# Patient Record
Sex: FEMALE | Race: BLACK OR AFRICAN AMERICAN | NOT HISPANIC OR LATINO | Employment: FULL TIME | URBAN - METROPOLITAN AREA
[De-identification: names, ages, dates, MRNs, and addresses within clinical notes are randomized per-mention and may not be internally consistent; named-entity substitution may affect disease eponyms.]

---

## 2018-08-17 ENCOUNTER — HOSPITAL ENCOUNTER (EMERGENCY)
Facility: HOSPITAL | Age: 43
Discharge: HOME | End: 2018-08-17
Attending: EMERGENCY MEDICINE
Payer: MEDICAID

## 2018-08-17 VITALS
SYSTOLIC BLOOD PRESSURE: 117 MMHG | TEMPERATURE: 98 F | HEART RATE: 68 BPM | BODY MASS INDEX: 37.03 KG/M2 | HEIGHT: 69 IN | OXYGEN SATURATION: 99 % | RESPIRATION RATE: 18 BRPM | DIASTOLIC BLOOD PRESSURE: 57 MMHG | WEIGHT: 250 LBS

## 2018-08-17 DIAGNOSIS — M54.50 LUMBAR BACK PAIN: ICD-10-CM

## 2018-08-17 DIAGNOSIS — R10.2 SUPRAPUBIC ABDOMINAL PAIN: Primary | ICD-10-CM

## 2018-08-17 DIAGNOSIS — N39.0 URINARY TRACT INFECTION WITHOUT HEMATURIA, SITE UNSPECIFIED: ICD-10-CM

## 2018-08-17 DIAGNOSIS — R07.9 CHEST PAIN: ICD-10-CM

## 2018-08-17 DIAGNOSIS — R10.9 BILATERAL FLANK PAIN: ICD-10-CM

## 2018-08-17 LAB
ALBUMIN SERPL BCP-MCNC: 4 G/DL
ALP SERPL-CCNC: 73 U/L
ALT SERPL W/O P-5'-P-CCNC: 18 U/L
ANION GAP SERPL CALC-SCNC: 9 MMOL/L
AST SERPL-CCNC: 20 U/L
B-HCG UR QL: NEGATIVE
BACTERIA #/AREA URNS HPF: ABNORMAL /HPF
BASOPHILS # BLD AUTO: 0.01 K/UL
BASOPHILS NFR BLD: 0.2 %
BILIRUB SERPL-MCNC: 0.4 MG/DL
BILIRUB UR QL STRIP: NEGATIVE
BUN SERPL-MCNC: 9 MG/DL
CALCIUM SERPL-MCNC: 9.4 MG/DL
CHLORIDE SERPL-SCNC: 104 MMOL/L
CLARITY UR: CLEAR
CO2 SERPL-SCNC: 23 MMOL/L
COLOR UR: YELLOW
CREAT SERPL-MCNC: 0.8 MG/DL
CTP QC/QA: YES
DIFFERENTIAL METHOD: ABNORMAL
EOSINOPHIL # BLD AUTO: 0.1 K/UL
EOSINOPHIL NFR BLD: 1.5 %
ERYTHROCYTE [DISTWIDTH] IN BLOOD BY AUTOMATED COUNT: 16.8 %
EST. GFR  (AFRICAN AMERICAN): >60 ML/MIN/1.73 M^2
EST. GFR  (NON AFRICAN AMERICAN): >60 ML/MIN/1.73 M^2
GLUCOSE SERPL-MCNC: 89 MG/DL
GLUCOSE UR QL STRIP: NEGATIVE
HCT VFR BLD AUTO: 36.5 %
HGB BLD-MCNC: 11.7 G/DL
HGB UR QL STRIP: ABNORMAL
KETONES UR QL STRIP: NEGATIVE
LEUKOCYTE ESTERASE UR QL STRIP: NEGATIVE
LYMPHOCYTES # BLD AUTO: 2.4 K/UL
LYMPHOCYTES NFR BLD: 51.5 %
MCH RBC QN AUTO: 24.3 PG
MCHC RBC AUTO-ENTMCNC: 32.1 G/DL
MCV RBC AUTO: 76 FL
MICROSCOPIC COMMENT: ABNORMAL
MONOCYTES # BLD AUTO: 0.3 K/UL
MONOCYTES NFR BLD: 5.5 %
NEUTROPHILS # BLD AUTO: 1.9 K/UL
NEUTROPHILS NFR BLD: 41.1 %
NITRITE UR QL STRIP: NEGATIVE
PH UR STRIP: 5 [PH] (ref 5–8)
PLATELET # BLD AUTO: 316 K/UL
PMV BLD AUTO: 9.5 FL
POCT GLUCOSE: 81 MG/DL (ref 70–110)
POTASSIUM SERPL-SCNC: 4 MMOL/L
PROT SERPL-MCNC: 9.3 G/DL
PROT UR QL STRIP: NEGATIVE
RBC # BLD AUTO: 4.81 M/UL
RBC #/AREA URNS HPF: 1 /HPF (ref 0–4)
SODIUM SERPL-SCNC: 136 MMOL/L
SP GR UR STRIP: 1.02 (ref 1–1.03)
SQUAMOUS #/AREA URNS HPF: 8 /HPF
TROPONIN I SERPL DL<=0.01 NG/ML-MCNC: <0.006 NG/ML
URN SPEC COLLECT METH UR: ABNORMAL
UROBILINOGEN UR STRIP-ACNC: NEGATIVE EU/DL
WBC # BLD AUTO: 4.72 K/UL
WBC #/AREA URNS HPF: 2 /HPF (ref 0–5)

## 2018-08-17 PROCEDURE — 85025 COMPLETE CBC W/AUTO DIFF WBC: CPT

## 2018-08-17 PROCEDURE — 80053 COMPREHEN METABOLIC PANEL: CPT

## 2018-08-17 PROCEDURE — 63600175 PHARM REV CODE 636 W HCPCS: Performed by: PHYSICIAN ASSISTANT

## 2018-08-17 PROCEDURE — 93005 ELECTROCARDIOGRAM TRACING: CPT

## 2018-08-17 PROCEDURE — 84484 ASSAY OF TROPONIN QUANT: CPT

## 2018-08-17 PROCEDURE — 81000 URINALYSIS NONAUTO W/SCOPE: CPT

## 2018-08-17 PROCEDURE — 93010 ELECTROCARDIOGRAM REPORT: CPT | Mod: ,,, | Performed by: INTERNAL MEDICINE

## 2018-08-17 PROCEDURE — 81025 URINE PREGNANCY TEST: CPT | Performed by: PHYSICIAN ASSISTANT

## 2018-08-17 PROCEDURE — 87086 URINE CULTURE/COLONY COUNT: CPT

## 2018-08-17 PROCEDURE — 82962 GLUCOSE BLOOD TEST: CPT | Mod: 59

## 2018-08-17 PROCEDURE — 96372 THER/PROPH/DIAG INJ SC/IM: CPT

## 2018-08-17 PROCEDURE — 99284 EMERGENCY DEPT VISIT MOD MDM: CPT | Mod: 25

## 2018-08-17 PROCEDURE — 25000003 PHARM REV CODE 250: Performed by: PHYSICIAN ASSISTANT

## 2018-08-17 RX ORDER — CEPHALEXIN 500 MG/1
500 CAPSULE ORAL EVERY 12 HOURS
Qty: 20 CAPSULE | Refills: 0 | Status: SHIPPED | OUTPATIENT
Start: 2018-08-17 | End: 2018-08-27

## 2018-08-17 RX ORDER — KETOROLAC TROMETHAMINE 30 MG/ML
15 INJECTION, SOLUTION INTRAMUSCULAR; INTRAVENOUS
Status: COMPLETED | OUTPATIENT
Start: 2018-08-17 | End: 2018-08-17

## 2018-08-17 RX ORDER — ONDANSETRON 4 MG/1
4 TABLET, ORALLY DISINTEGRATING ORAL EVERY 6 HOURS PRN
Qty: 15 TABLET | Refills: 0 | Status: SHIPPED | OUTPATIENT
Start: 2018-08-17 | End: 2018-08-22

## 2018-08-17 RX ORDER — ACETAMINOPHEN 500 MG
500 TABLET ORAL EVERY 4 HOURS PRN
Qty: 20 TABLET | Refills: 0 | Status: SHIPPED | OUTPATIENT
Start: 2018-08-17 | End: 2018-08-22

## 2018-08-17 RX ORDER — CEPHALEXIN 500 MG/1
500 CAPSULE ORAL
Status: COMPLETED | OUTPATIENT
Start: 2018-08-17 | End: 2018-08-17

## 2018-08-17 RX ORDER — ETODOLAC 200 MG/1
200 CAPSULE ORAL 3 TIMES DAILY PRN
Qty: 20 CAPSULE | Refills: 0 | Status: SHIPPED | OUTPATIENT
Start: 2018-08-17 | End: 2018-08-24

## 2018-08-17 RX ORDER — ONDANSETRON 4 MG/1
4 TABLET, ORALLY DISINTEGRATING ORAL
Status: COMPLETED | OUTPATIENT
Start: 2018-08-17 | End: 2018-08-17

## 2018-08-17 RX ORDER — DICYCLOMINE HYDROCHLORIDE 20 MG/1
20 TABLET ORAL 4 TIMES DAILY PRN
Qty: 28 TABLET | Refills: 0 | Status: SHIPPED | OUTPATIENT
Start: 2018-08-17 | End: 2018-08-24

## 2018-08-17 RX ADMIN — ONDANSETRON 4 MG: 4 TABLET, ORALLY DISINTEGRATING ORAL at 09:08

## 2018-08-17 RX ADMIN — KETOROLAC TROMETHAMINE 15 MG: 30 INJECTION, SOLUTION INTRAMUSCULAR at 09:08

## 2018-08-17 RX ADMIN — CEPHALEXIN 500 MG: 500 CAPSULE ORAL at 11:08

## 2018-08-17 NOTE — ED PROVIDER NOTES
Encounter Date: 2018       History     Chief Complaint   Patient presents with    Abdominal Pain      reports having right lower back pain, and right lower abdominal apin, for past two weeks, denies pain with urination.      CC: Abdominal Pain    HPI:   Pt is a 42 y/o female with history of anemia presents for evaluation of 2 week history of constant, severe 10/10, aching and stabbing suprapubic pain radiating to lumbar back pain and bilateral flanks. Reports pain is worse with walking. Pt reports chronic intermittent tingling to RLE. Denies bowel or bladder incontinence, saddle anesthesia, weakness, IVDU or rash. Pt also c/o aching frontal HA 8/10 that began this morning with associated nausea. Pt is here today with her daughter presenting for vomiting and diarrhea.  Pt denies fever, chills or vomiting. Pt additionally c/o single episode of burning CP lasting 1 hour yesterday that occurred while she was under increased stress.  Denies CP currently, SOB, dizziness, lightheadedness, nausea, vomiting. No attempted tx.               Review of patient's allergies indicates:  No Known Allergies  Past Medical History:   Diagnosis Date    Anemia     Bronchitis      Past Surgical History:   Procedure Laterality Date     SECTION      HAND SURGERY      MYOMECTOMY       Family History   Problem Relation Age of Onset    Stroke Maternal Grandmother     Diabetes Maternal Grandmother     Breast cancer Maternal Grandmother     Diabetes Mother     Hypertension Mother     Diabetes Sister     Colon cancer Neg Hx     Ovarian cancer Neg Hx      Social History     Tobacco Use    Smoking status: Never Smoker   Substance Use Topics    Alcohol use: Yes    Drug use: No     Review of Systems   Constitutional: Negative for chills and fever.   HENT: Negative for congestion, ear pain, rhinorrhea and sore throat.    Eyes: Negative for photophobia and visual disturbance.   Respiratory: Negative for shortness of  breath.    Cardiovascular: Positive for chest pain. Negative for leg swelling.   Gastrointestinal: Positive for abdominal pain and nausea. Negative for constipation, diarrhea and vomiting.   Genitourinary: Positive for flank pain. Negative for dysuria, frequency, hematuria and urgency.   Musculoskeletal: Positive for back pain.   Neurological: Positive for light-headedness and headaches. Negative for dizziness, weakness and numbness.        (-) bowel or bladder incontinence  (-) saddle anesthesia          Physical Exam     Initial Vitals [08/17/18 0822]   BP Pulse Resp Temp SpO2   124/77 78 18 98.2 °F (36.8 °C) 99 %      MAP       --         Physical Exam    Nursing note and vitals reviewed.  Constitutional: She appears well-developed and well-nourished.   HENT:   Head: Normocephalic.   Right Ear: External ear normal.   Left Ear: External ear normal.   Nose: Nose normal.   Mouth/Throat: Oropharynx is clear and moist.   Eyes: Conjunctivae are normal.   Cardiovascular: Normal rate and regular rhythm. Exam reveals no gallop and no friction rub.    No murmur heard.  Pulmonary/Chest: Breath sounds normal. She has no wheezes. She has no rhonchi. She has no rales.   Abdominal: Soft. Bowel sounds are normal. She exhibits no distension. There is tenderness in the suprapubic area. There is no rigidity, no rebound, no guarding, no CVA tenderness, no tenderness at McBurney's point and negative Stout's sign.   TTP to bilateral flank   Musculoskeletal: Normal range of motion.   No midline or paraspinal TTP. Pain to lumbar back reproduced with rotation of spine   Lymphadenopathy:     She has no cervical adenopathy.   Neurological: She is alert. She has normal strength. No cranial nerve deficit or sensory deficit. Coordination and gait normal.   Skin: Skin is warm and dry.   Psychiatric: She has a normal mood and affect.         ED Course   Procedures  Labs Reviewed   URINALYSIS, REFLEX TO URINE CULTURE - Abnormal; Notable for the  following components:       Result Value    Occult Blood UA 2+ (*)     All other components within normal limits    Narrative:     Preferred Collection Type->Urine, Clean Catch   COMPREHENSIVE METABOLIC PANEL - Abnormal; Notable for the following components:    Total Protein 9.3 (*)     All other components within normal limits   CBC W/ AUTO DIFFERENTIAL - Abnormal; Notable for the following components:    Hemoglobin 11.7 (*)     Hematocrit 36.5 (*)     MCV 76 (*)     MCH 24.3 (*)     RDW 16.8 (*)     Lymph% 51.5 (*)     All other components within normal limits   URINALYSIS MICROSCOPIC - Abnormal; Notable for the following components:    Bacteria, UA Moderate (*)     All other components within normal limits    Narrative:     Preferred Collection Type->Urine, Clean Catch   CULTURE, URINE   TROPONIN I   POCT URINE PREGNANCY   POCT GLUCOSE          Imaging Results          X-Ray Chest PA And Lateral (Final result)  Result time 08/17/18 09:38:30    Final result by Chai Hutchison MD (08/17/18 09:38:30)                 Impression:      Normal chest.      Electronically signed by: Chai Hutchison MD  Date:    08/17/2018  Time:    09:38             Narrative:    EXAMINATION:  XR CHEST PA AND LATERAL    CLINICAL HISTORY:  Chest pain, unspecified    TECHNIQUE:  PA and lateral views of the chest were performed.    COMPARISON:  None    FINDINGS:  Heart normal.  Lungs clear.  Bony thorax normal.                                 Medical Decision Making:   Initial Assessment:   43-year-old female with no pertinent past medical history presenting for evaluation of multiple medical complaints as detailed above.  Pt reports suprapubic pain radiating to flank bilaterally and lumbar back identical to symptoms with UTI previously. Mild suprapubic tenderness to palpation no peritoneal signs.  UA contaminated sample.  Will treat patient for possible UTI with Keflex.  Doubt acute surgical abdomen.  The Lumbar back pain  reproduced with rotation spine.  Considered but doubt cauda equina, epidural abscess.  Patient also complaining of 1 episode of chest pain that occurred yesterday lasting 1 hr.  Denies chest pain at this time.  Cardiac workup negative. P Patient also complaining of frontal headache and nausea.  We evaluated with daughter today who is here for vomiting and diarrhea.  No neuro deficits.  No meningeal signs. Patient given Toradol and Zofran with the improvement of her headache and abdominal pain.Patient discharged home in stable condition with Lodine and Tylenol as needed for headaches and back pain. Zofran for nausea. Bentyl if she develops gastroenteritis symptoms. PCP follow up in 2 days. Er return precautions discussed including worsening symptoms or a sneeded. Discussed pt with Dr. Damon who agrees with assessment an dplan.                       Clinical Impression:   The primary encounter diagnosis was Suprapubic abdominal pain. Diagnoses of Chest pain, Bilateral flank pain, Lumbar back pain, and Urinary tract infection without hematuria, site unspecified were also pertinent to this visit.                             Yumiko Arevalo PA-C  08/17/18 1331       Yumiko Arevalo PA-C  08/17/18 1331

## 2018-08-17 NOTE — ED TRIAGE NOTES
"Patient reports low back pain x 2 weeks.  Denies burning with urination, but does report urinary hesitancy. Patient also reports intermittent nausea, denies vomiting or fever.  Patient states, "it feels like I am coming down with something".  "

## 2018-08-17 NOTE — DISCHARGE INSTRUCTIONS
Take full course of Keflex as prescribed for urinary tract infection.  Take Lodine and Tylenol as needed for back pain and headaches.   Take Zofran as needed for nausea.  Take Bentyl as prescribed if you develop abdominal cramping/stomach virus.    Follow up with primary care in  2 days. Return to ER for worsening symptoms, inability to tolerate by mouth or as needed.

## 2018-08-19 LAB — BACTERIA UR CULT: NORMAL

## 2018-09-21 ENCOUNTER — HOSPITAL ENCOUNTER (EMERGENCY)
Facility: HOSPITAL | Age: 43
Discharge: HOME | End: 2018-09-21
Attending: EMERGENCY MEDICINE
Payer: MEDICAID

## 2018-09-21 VITALS
HEART RATE: 88 BPM | TEMPERATURE: 98 F | HEIGHT: 69 IN | SYSTOLIC BLOOD PRESSURE: 128 MMHG | BODY MASS INDEX: 35.55 KG/M2 | DIASTOLIC BLOOD PRESSURE: 76 MMHG | WEIGHT: 240 LBS | OXYGEN SATURATION: 99 % | RESPIRATION RATE: 16 BRPM

## 2018-09-21 DIAGNOSIS — S09.93XA BLUNT TRAUMA OF FACE, INITIAL ENCOUNTER: Primary | ICD-10-CM

## 2018-09-21 LAB
B-HCG UR QL: NEGATIVE
CTP QC/QA: YES

## 2018-09-21 PROCEDURE — 25000003 PHARM REV CODE 250: Performed by: PHYSICIAN ASSISTANT

## 2018-09-21 PROCEDURE — 99284 EMERGENCY DEPT VISIT MOD MDM: CPT

## 2018-09-21 PROCEDURE — 81025 URINE PREGNANCY TEST: CPT | Performed by: PHYSICIAN ASSISTANT

## 2018-09-21 RX ORDER — IBUPROFEN 600 MG/1
600 TABLET ORAL
Status: COMPLETED | OUTPATIENT
Start: 2018-09-21 | End: 2018-09-21

## 2018-09-21 RX ORDER — IBUPROFEN 600 MG/1
600 TABLET ORAL EVERY 6 HOURS PRN
Qty: 15 TABLET | Refills: 0 | Status: SHIPPED | OUTPATIENT
Start: 2018-09-21 | End: 2019-05-01

## 2018-09-21 RX ADMIN — IBUPROFEN 600 MG: 600 TABLET, FILM COATED ORAL at 01:09
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
## 2018-09-21 NOTE — DISCHARGE INSTRUCTIONS
Ibuprofen or Tylenol as needed for discomfort. Ice to face to help with swelling. Follow-up with your primary care provider next week for reevaluation. Return to this ED if you begin with uncontrolled nausea/vomiting, if blurred vision worsens, if any other problems occur.
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
## 2019-03-08 ENCOUNTER — HOSPITAL ENCOUNTER (OUTPATIENT)
Facility: HOSPITAL | Age: 44
Discharge: HOME | End: 2019-03-09
Attending: EMERGENCY MEDICINE | Admitting: EMERGENCY MEDICINE
Payer: MEDICAID

## 2019-03-08 DIAGNOSIS — R07.9 CHEST PAIN, UNSPECIFIED TYPE: ICD-10-CM

## 2019-03-08 DIAGNOSIS — R06.09 DOE (DYSPNEA ON EXERTION): ICD-10-CM

## 2019-03-08 DIAGNOSIS — R07.2 PRECORDIAL PAIN: ICD-10-CM

## 2019-03-08 DIAGNOSIS — R55 SYNCOPE: Primary | ICD-10-CM

## 2019-03-08 DIAGNOSIS — R07.9 CHEST PAIN: ICD-10-CM

## 2019-03-08 LAB
ALBUMIN SERPL BCP-MCNC: 3.4 G/DL
ALP SERPL-CCNC: 71 U/L
ALT SERPL W/O P-5'-P-CCNC: 16 U/L
ANION GAP SERPL CALC-SCNC: 5 MMOL/L
AORTIC ROOT ANNULUS: 2.85 CM
AORTIC VALVE CUSP SEPERATION: 1.78 CM
ASCENDING AORTA: 2.15 CM
AST SERPL-CCNC: 19 U/L
AV INDEX (PROSTH): 0.83
AV MEAN GRADIENT: 5.03 MMHG
AV PEAK GRADIENT: 9.12 MMHG
AV VALVE AREA: 2.51 CM2
AV VELOCITY RATIO: 0.82
B-HCG UR QL: NEGATIVE
BASOPHILS # BLD AUTO: 0 K/UL
BASOPHILS NFR BLD: 0 %
BILIRUB SERPL-MCNC: 0.3 MG/DL
BILIRUB UR QL STRIP: NEGATIVE
BNP SERPL-MCNC: <10 PG/ML
BSA FOR ECHO PROCEDURE: 2.42 M2
BUN SERPL-MCNC: 8 MG/DL
CALCIUM SERPL-MCNC: 8.7 MG/DL
CHLORIDE SERPL-SCNC: 106 MMOL/L
CLARITY UR: CLEAR
CO2 SERPL-SCNC: 26 MMOL/L
COLOR UR: ABNORMAL
CREAT SERPL-MCNC: 0.7 MG/DL
CTP QC/QA: YES
CV ECHO LV RWT: 0.35 CM
D DIMER PPP IA.FEU-MCNC: 0.74 MG/L FEU
DIFFERENTIAL METHOD: ABNORMAL
DOP CALC AO PEAK VEL: 1.51 M/S
DOP CALC AO VTI: 32.39 CM
DOP CALC LVOT AREA: 3.02 CM2
DOP CALC LVOT DIAMETER: 1.96 CM
DOP CALC LVOT PEAK VEL: 1.24 M/S
DOP CALC LVOT STROKE VOLUME: 81.27 CM3
DOP CALCLVOT PEAK VEL VTI: 26.95 CM
E WAVE DECELERATION TIME: 169.87 MSEC
E/A RATIO: 1.2
ECHO LV POSTERIOR WALL: 0.9 CM (ref 0.6–1.1)
EOSINOPHIL # BLD AUTO: 0.1 K/UL
EOSINOPHIL NFR BLD: 2.2 %
ERYTHROCYTE [DISTWIDTH] IN BLOOD BY AUTOMATED COUNT: 17.4 %
EST. GFR  (AFRICAN AMERICAN): >60 ML/MIN/1.73 M^2
EST. GFR  (NON AFRICAN AMERICAN): >60 ML/MIN/1.73 M^2
FRACTIONAL SHORTENING: 32 % (ref 28–44)
GLUCOSE SERPL-MCNC: 95 MG/DL
GLUCOSE UR QL STRIP: NEGATIVE
HCT VFR BLD AUTO: 33.2 %
HGB BLD-MCNC: 10.4 G/DL
HGB UR QL STRIP: ABNORMAL
INTERVENTRICULAR SEPTUM: 0.87 CM (ref 0.6–1.1)
IVRT: 0.07 MSEC
KETONES UR QL STRIP: NEGATIVE
LA MAJOR: 5.37 CM
LA MINOR: 5.39 CM
LA WIDTH: 4.49 CM
LEFT ATRIUM SIZE: 3.33 CM
LEFT ATRIUM VOLUME INDEX: 29.4 ML/M2
LEFT ATRIUM VOLUME: 68.37 CM3
LEFT INTERNAL DIMENSION IN SYSTOLE: 3.55 CM (ref 2.1–4)
LEFT VENTRICLE DIASTOLIC VOLUME INDEX: 55.6 ML/M2
LEFT VENTRICLE DIASTOLIC VOLUME: 129.44 ML
LEFT VENTRICLE MASS INDEX: 71 G/M2
LEFT VENTRICLE SYSTOLIC VOLUME INDEX: 22.7 ML/M2
LEFT VENTRICLE SYSTOLIC VOLUME: 52.79 ML
LEFT VENTRICULAR INTERNAL DIMENSION IN DIASTOLE: 5.2 CM (ref 3.5–6)
LEFT VENTRICULAR MASS: 165.34 G
LEUKOCYTE ESTERASE UR QL STRIP: NEGATIVE
LYMPHOCYTES # BLD AUTO: 2.2 K/UL
LYMPHOCYTES NFR BLD: 53.6 %
MAGNESIUM SERPL-MCNC: 1.8 MG/DL
MCH RBC QN AUTO: 24.3 PG
MCHC RBC AUTO-ENTMCNC: 31.3 G/DL
MCV RBC AUTO: 78 FL
MICROSCOPIC COMMENT: ABNORMAL
MONOCYTES # BLD AUTO: 0.3 K/UL
MONOCYTES NFR BLD: 8.2 %
MV PEAK A VEL: 0.98 M/S
MV PEAK E VEL: 1.18 M/S
NEUTROPHILS # BLD AUTO: 1.5 K/UL
NEUTROPHILS NFR BLD: 36 %
NITRITE UR QL STRIP: NEGATIVE
PH UR STRIP: 7 [PH] (ref 5–8)
PISA TR MAX VEL: 2.55 M/S
PLATELET # BLD AUTO: 281 K/UL
PMV BLD AUTO: 9.3 FL
POTASSIUM SERPL-SCNC: 3.6 MMOL/L
PROT SERPL-MCNC: 7.9 G/DL
PROT UR QL STRIP: NEGATIVE
PULM VEIN S/D RATIO: 0.97
PV PEAK D VEL: 0.61 M/S
PV PEAK S VEL: 0.59 M/S
PV PEAK VELOCITY: 0.93 CM/S
RA MAJOR: 5.07 CM
RA PRESSURE: 8 MMHG
RA WIDTH: 3.71 CM
RBC # BLD AUTO: 4.28 M/UL
RBC #/AREA URNS HPF: 75 /HPF (ref 0–4)
RIGHT VENTRICULAR END-DIASTOLIC DIMENSION: 3.87 CM
RV TISSUE DOPPLER FREE WALL SYSTOLIC VELOCITY 1 (APICAL 4 CHAMBER VIEW): 8.72 M/S
SINUS: 2.7 CM
SODIUM SERPL-SCNC: 137 MMOL/L
SP GR UR STRIP: 1.01 (ref 1–1.03)
STJ: 2.15 CM
TR MAX PG: 26.01 MMHG
TRICUSPID ANNULAR PLANE SYSTOLIC EXCURSION: 1.87 CM
TROPONIN I SERPL DL<=0.01 NG/ML-MCNC: <0.006 NG/ML
TV REST PULMONARY ARTERY PRESSURE: 34 MMHG
URN SPEC COLLECT METH UR: ABNORMAL
UROBILINOGEN UR STRIP-ACNC: NEGATIVE EU/DL
WBC # BLD AUTO: 4.14 K/UL
WBC #/AREA URNS HPF: 4 /HPF (ref 0–5)

## 2019-03-08 PROCEDURE — 25000003 PHARM REV CODE 250: Performed by: EMERGENCY MEDICINE

## 2019-03-08 PROCEDURE — 99220 PR INITIAL OBSERVATION CARE,LEVL III: CPT | Mod: ,,, | Performed by: INTERNAL MEDICINE

## 2019-03-08 PROCEDURE — 25500020 PHARM REV CODE 255: Performed by: EMERGENCY MEDICINE

## 2019-03-08 PROCEDURE — 93010 EKG 12-LEAD: ICD-10-PCS | Mod: ,,, | Performed by: INTERNAL MEDICINE

## 2019-03-08 PROCEDURE — 84484 ASSAY OF TROPONIN QUANT: CPT | Mod: 91

## 2019-03-08 PROCEDURE — 96361 HYDRATE IV INFUSION ADD-ON: CPT | Performed by: EMERGENCY MEDICINE

## 2019-03-08 PROCEDURE — 25000003 PHARM REV CODE 250: Performed by: PHYSICIAN ASSISTANT

## 2019-03-08 PROCEDURE — 80053 COMPREHEN METABOLIC PANEL: CPT

## 2019-03-08 PROCEDURE — 63600175 PHARM REV CODE 636 W HCPCS: Performed by: PHYSICIAN ASSISTANT

## 2019-03-08 PROCEDURE — 83880 ASSAY OF NATRIURETIC PEPTIDE: CPT

## 2019-03-08 PROCEDURE — 96372 THER/PROPH/DIAG INJ SC/IM: CPT | Mod: 59 | Performed by: EMERGENCY MEDICINE

## 2019-03-08 PROCEDURE — 81025 URINE PREGNANCY TEST: CPT | Performed by: EMERGENCY MEDICINE

## 2019-03-08 PROCEDURE — 81000 URINALYSIS NONAUTO W/SCOPE: CPT

## 2019-03-08 PROCEDURE — 85025 COMPLETE CBC W/AUTO DIFF WBC: CPT

## 2019-03-08 PROCEDURE — G0378 HOSPITAL OBSERVATION PER HR: HCPCS

## 2019-03-08 PROCEDURE — 99285 EMERGENCY DEPT VISIT HI MDM: CPT | Mod: 25

## 2019-03-08 PROCEDURE — 93010 ELECTROCARDIOGRAM REPORT: CPT | Mod: ,,, | Performed by: INTERNAL MEDICINE

## 2019-03-08 PROCEDURE — 96360 HYDRATION IV INFUSION INIT: CPT | Mod: 59

## 2019-03-08 PROCEDURE — 93005 ELECTROCARDIOGRAM TRACING: CPT

## 2019-03-08 PROCEDURE — 85379 FIBRIN DEGRADATION QUANT: CPT

## 2019-03-08 PROCEDURE — 99220 PR INITIAL OBSERVATION CARE,LEVL III: ICD-10-PCS | Mod: ,,, | Performed by: INTERNAL MEDICINE

## 2019-03-08 PROCEDURE — 83735 ASSAY OF MAGNESIUM: CPT

## 2019-03-08 RX ORDER — IBUPROFEN 400 MG/1
400 TABLET ORAL EVERY 6 HOURS PRN
Status: DISCONTINUED | OUTPATIENT
Start: 2019-03-08 | End: 2019-03-09 | Stop reason: HOSPADM

## 2019-03-08 RX ORDER — SODIUM CHLORIDE 9 MG/ML
INJECTION, SOLUTION INTRAVENOUS CONTINUOUS
Status: DISCONTINUED | OUTPATIENT
Start: 2019-03-08 | End: 2019-03-09 | Stop reason: HOSPADM

## 2019-03-08 RX ORDER — ENOXAPARIN SODIUM 100 MG/ML
40 INJECTION SUBCUTANEOUS EVERY 24 HOURS
Status: DISCONTINUED | OUTPATIENT
Start: 2019-03-08 | End: 2019-03-09 | Stop reason: HOSPADM

## 2019-03-08 RX ORDER — SODIUM CHLORIDE 0.9 % (FLUSH) 0.9 %
5 SYRINGE (ML) INJECTION
Status: DISCONTINUED | OUTPATIENT
Start: 2019-03-08 | End: 2019-03-09 | Stop reason: HOSPADM

## 2019-03-08 RX ORDER — SODIUM CHLORIDE 9 MG/ML
125 INJECTION, SOLUTION INTRAVENOUS ONCE
Status: COMPLETED | OUTPATIENT
Start: 2019-03-08 | End: 2019-03-08

## 2019-03-08 RX ADMIN — SODIUM CHLORIDE 125 ML/HR: 0.9 INJECTION, SOLUTION INTRAVENOUS at 10:03

## 2019-03-08 RX ADMIN — SODIUM CHLORIDE 500 ML: 0.9 INJECTION, SOLUTION INTRAVENOUS at 10:03

## 2019-03-08 RX ADMIN — IOHEXOL 75 ML: 350 INJECTION, SOLUTION INTRAVENOUS at 12:03

## 2019-03-08 RX ADMIN — SODIUM CHLORIDE: 0.9 INJECTION, SOLUTION INTRAVENOUS at 08:03

## 2019-03-08 RX ADMIN — ENOXAPARIN SODIUM 40 MG: 100 INJECTION SUBCUTANEOUS at 08:03

## 2019-03-08 NOTE — ASSESSMENT & PLAN NOTE
EKG of NSR, no PE seen on CTA, troponin negative. Patient reports intermittent episodes of chest pain and SOB worsened with exertion for the last 6 months, and family history of cardiac disease.  -Troponin trend  -Telemetry  -Echo  -TSH/Lipid  -cardiology consult  -CT head

## 2019-03-08 NOTE — HPI
Fany Fontenot 44 y.o. female presents to the hospital with a chief complaint of syncope. She reports today she was walking downstairs when she experienced a syncopal episode. She reports she was dizzy/lightheaded before the syncopal episode. She believes she was down for a few minutes. She reports she landed on her lower back. She has been experiencing intermittent sharp chest pains caused with exertion for the last 6 months. Today she experienced similar chest pain while at rest, that then resolved on its own. She finds for the last 6 months she has had progressively worsening SOB. She now becomes SOB within walking 1 flight of stairs. She denies chest pain or SOB with the syncopal episode today and denies any symptoms now. Her mother and her mother's aunts had heart disease. Her aunts  in their 50's from heart disease. Her mother  in her 60's. She endorses syncope, nausea, dizziness, SOB, chest pain. She denies vomiting, diarrhea, constipation, dysuria, orthopnea, and lower extremity edema.    In the ED, H&H 10.4&33.2, d-dimer 0.74, troponin negative, UA without nitrites or leukocytes, chest x-ray limited due to body habitus, but no consolidations, no PE on CTA.

## 2019-03-08 NOTE — CONSULTS
[facility]  Cardiology  Consult Note    Patient Name: Fany Fontenot  MRN: [id number]  Admission Date: 3/8/2019  Hospital Length of Stay: 0 days  Code Status: Full Code   Attending Provider: Gucci Perez MD   Consulting Provider: Armen Love MD  Primary Care Physician: Randa Starr MD  Principal Problem:Syncope    Patient information was obtained from patient and ER records.     Inpatient consult to Cardiology  Consult performed by: Armen Love MD  Consult ordered by: Antonio Galvan PA-C  Reason for consult: Syncope, CP        Subjective:     Chief Complaint:  Syncope     HPI:   44 y.o. female presents to the hospital with a chief complaint of syncope. She reports today she was walking downstairs when she experienced a syncopal episode. She reports she was dizzy/lightheaded before the syncopal episode. She believes she was down for a few minutes. She reports she landed on her lower back. She has been experiencing intermittent sharp chest pains caused with exertion for the last 6 months. Today she experienced similar chest pain while at rest, that then resolved on its own. She finds for the last 6 months she has had progressively worsening SOB. She now becomes SOB within walking 1 flight of stairs. She denies chest pain or SOB with the syncopal episode today and denies any symptoms now. Her mother and her mother's aunts had heart disease. Her aunts  in their 50's from heart disease. Her mother  in her 60's. She endorses syncope, nausea, dizziness, SOB, chest pain. She denies vomiting, diarrhea, constipation, dysuria, orthopnea, and lower extremity edema.  In the ED, H&H 10.4&33.2, d-dimer 0.74, troponin negative, UA without nitrites or leukocytes, chest x-ray limited due to body habitus, but no consolidations, no PE on CTA.    The patient is a very pleasant 44-year-old  woman with no prior cardiovascular or medical history on no medications, although  she does have a family history of premature CAD.  She presented to the emergency room with a syncopal episode.  She describes approximately 1 week of nausea and dehydration.  This morning, after wakening and going down to her kitchen, she felt herself getting lightheaded.  She tried to make her way back up stairs to her bed, but apparently lost consciousness.  She fell backwards and thinks she may have hit her head and was on the ground for approximately 15 min.  She awoke after that feeling somewhat groggy.  Prior to passing out she did feel some nausea and diaphoresis.  She did not describe any chest pain or palpitations.  In addition to the symptoms, the patient does describe dyspnea on exertion, as well as exertional chest discomfort consistent with angina.  She denies any chest discomfort at rest.  She otherwise has had no PND, orthopnea, or lower extremity edema.  She denies any melena, hematuria, or claudicant symptoms.    Cardiac workup so far has included a normal electrocardiogram, and negative troponin x1.  Her echo notes mild LV dysfunction, without wall motion abnormality.  There are no valvular abnormalities or pericardial effusion.    Past Medical History:   Diagnosis Date    Anemia     Bronchitis     Syncope 2019       Past Surgical History:   Procedure Laterality Date     SECTION  2008    DEBRIDEMENT OF CARPAL BONE Right 2015    long finger, s/p traumatic injury    DEBRIDEMENT-CARPAL Right 2015    Performed by [facility] at [facility]    MYOMECTOMY  2007    PERCUTANEOUS PINNING OF FINGER Right 2015    long finger, s/p traumatic injury    PINNING-PERCUTANEOUS-FINGER Right 2015    Performed by [facility] at [facility] OR       Review of patient's allergies indicates:  No Known Allergies    No current facility-administered medications on file prior to encounter.      Current Outpatient Medications on File Prior to Encounter    Medication Sig    ibuprofen (ADVIL,MOTRIN) 600 MG tablet Take 1 tablet (600 mg total) by mouth every 6 (six) hours as needed for Pain.     Family History     Problem Relation (Age of Onset)    Breast cancer Maternal Grandmother    Diabetes Maternal Grandmother, Mother, Sister    Hypertension Mother    Stroke Maternal Grandmother        Tobacco Use    Smoking status: Never Smoker    Smokeless tobacco: Never Used   Substance and Sexual Activity    Alcohol use: Yes     Comment: socially    Drug use: No    Sexual activity: Yes     Partners: Male     Birth control/protection: None     Review of Systems   Constitution: Negative for chills, diaphoresis, fever, weakness and malaise/fatigue.   HENT: Negative for nosebleeds.    Eyes: Negative for blurred vision and double vision.   Cardiovascular: Positive for chest pain and syncope. Negative for claudication, cyanosis, dyspnea on exertion, leg swelling, orthopnea, palpitations and paroxysmal nocturnal dyspnea.   Respiratory: Negative for cough, shortness of breath and wheezing.    Skin: Negative for dry skin and poor wound healing.   Musculoskeletal: Negative for back pain, joint swelling and myalgias.   Gastrointestinal: Negative for abdominal pain, nausea and vomiting.   Genitourinary: Negative for hematuria.   Neurological: Negative for dizziness, headaches, numbness and seizures.   Psychiatric/Behavioral: Negative for altered mental status and depression.     Objective:     Vital Signs (Most Recent):  Temp: 98 °F (36.7 °C) (03/08/19 0936)  Pulse: 87 (03/08/19 1406)  Resp: 18(standing) (03/08/19 1007)  BP: 135/74 (03/08/19 1406)  SpO2: 100 % (03/08/19 1332) Vital Signs (24h Range):  Temp:  [98 °F (36.7 °C)] 98 °F (36.7 °C)  Pulse:  [67-87] 87  Resp:  [18-20] 18  SpO2:  [98 %-100 %] 100 %  BP: (127-167)/(64-93) 135/74     Weight: 120.2 kg (265 lb)  Body mass index is 39.13 kg/m².    SpO2: 100 %  O2 Device (Oxygen Therapy): room air      Intake/Output Summary (Last  24 hours) at 3/8/2019 1645  Last data filed at 3/8/2019 1204  Gross per 24 hour   Intake 500 ml   Output --   Net 500 ml       Lines/Drains/Airways     Peripheral Intravenous Line                 Peripheral IV - Single Lumen 03/08/19 1000 Right Antecubital less than 1 day                Physical Exam   Constitutional: She is oriented to person, place, and time. She appears well-developed and well-nourished. No distress.   HENT:   Head: Normocephalic and atraumatic.   Mouth/Throat: No oropharyngeal exudate.   Eyes: Conjunctivae and EOM are normal. Pupils are equal, round, and reactive to light. No scleral icterus.   Neck: Normal range of motion. Neck supple. No JVD present. No tracheal deviation present. No thyromegaly present.   Cardiovascular: Normal rate, regular rhythm, S1 normal and S2 normal. Exam reveals no gallop and no friction rub.   No murmur heard.  Pulmonary/Chest: Effort normal and breath sounds normal. No respiratory distress. She has no wheezes. She has no rales. She exhibits no tenderness.   Abdominal: Soft. She exhibits no distension.   Musculoskeletal: Normal range of motion. She exhibits no edema.   Neurological: She is alert and oriented to person, place, and time. No cranial nerve deficit.   Skin: Skin is warm and dry. She is not diaphoretic.   Psychiatric: She has a normal mood and affect. Her behavior is normal. Judgment normal.       Current Medications:      sodium chloride 0.9%    Laboratory (all labs reviewed):  CBC:  Recent Labs   Lab 08/17/18  1005 03/08/19  1001   WHITE BLOOD CELL COUNT 4.72 4.14   HEMOGLOBIN 11.7 L 10.4 L   HEMATOCRIT 36.5 L 33.2 L   PLATELETS 316 281       CHEMISTRIES:  Recent Labs   Lab 08/17/18  1005 03/08/19  1001   GLUCOSE 89 95   SODIUM 136 137   POTASSIUM 4.0 3.6   BUN BLD 9 8   CREATININE 0.8 0.7   EGFR IF  >60 >60   EGFR IF NON- >60 >60   CALCIUM 9.4 8.7   MAGNESIUM  --  1.8       CARDIAC BIOMARKERS:  Recent Labs   Lab  08/17/18  1005 03/08/19  1001   TROPONIN I <0.006 <0.006       COAGS:        LIPIDS/LFTS:  Recent Labs   Lab 08/17/18  1005 03/08/19  1001   AST 20 19   ALT 18 16       BNP:  Recent Labs   Lab 03/08/19  1001   BNP <10       TSH:        Free T4:        Diagnostic Results:  ECG (personally reviewed and interpreted tracing(s)):  3/8/19 1001 SR 70    Chest X-Ray (personally reviewed and interpreted image(s)): 3/8/19 NAD    CTA Chest 3/8/19  No convincing filling defect to indicate a pulmonary embolus.  The right upper lobe pulmonary artery is partially obscured by artifact from the high-density contrast in the SVC.  Soft tissue prominence in the subcarinal region either the esophagus or subcarinal node.    Echo: ordered        Assessment and Plan:     * Syncope    Symptoms seem most consistent with a neurocardiogenic etiology and the pt describes 1 week of nausea leading up to this suggest dehydration as well.  EKG is normal, trop x1 is normal and LV fxn is only mildly diminished.  CTA neg for PE.  I doubt ACS or malignant arrhythmia.  Check postural VS.  Hydrate.  Repeat trop.  Assuming no significant elev and no arrhythmia overnight, dispo planning for outpatient cardiac stress testing is appropriate.     Precordial pain    CP with FH premat CAD as only RF.  EKG normal  Echo with EF 45%  Will plan outpatient stress testing         VTE Risk Mitigation (From admission, onward)    None          Thank you for your consult. I will follow-up with patient. Please contact us if you have any additional questions.    Armen Love MD  Cardiology   [facility]

## 2019-03-08 NOTE — SUBJECTIVE & OBJECTIVE
Past Medical History:   Diagnosis Date    Anemia     Bronchitis     Syncope 2019       Past Surgical History:   Procedure Laterality Date     SECTION  2008    DEBRIDEMENT OF CARPAL BONE Right 2015    long finger, s/p traumatic injury    DEBRIDEMENT-CARPAL Right 2015    Performed by [facility] at [facility]    MYOMECTOMY  2007    PERCUTANEOUS PINNING OF FINGER Right 2015    long finger, s/p traumatic injury    PINNING-PERCUTANEOUS-FINGER Right 2015    Performed by Nik[facility]ai Ha MD at [facility] [place]       Review of patient's allergies indicates:  No Known Allergies    No current facility-administered medications on file prior to encounter.      Current Outpatient Medications on File Prior to Encounter   Medication Sig    ibuprofen (ADVIL,MOTRIN) 600 MG tablet Take 1 tablet (600 mg total) by mouth every 6 (six) hours as needed for Pain.     Family History     Problem Relation (Age of Onset)    Breast cancer Maternal Grandmother    Diabetes Maternal Grandmother, Mother, Sister    Hypertension Mother    Stroke Maternal Grandmother        Tobacco Use    Smoking status: Never Smoker    Smokeless tobacco: Never Used   Substance and Sexual Activity    Alcohol use: Yes     Comment: socially    Drug use: No    Sexual activity: Yes     Partners: Male     Birth control/protection: None     Review of Systems   Constitution: Negative for chills, diaphoresis, fever, weakness and malaise/fatigue.   HENT: Negative for nosebleeds.    Eyes: Negative for blurred vision and double vision.   Cardiovascular: Positive for chest pain and syncope. Negative for claudication, cyanosis, dyspnea on exertion, leg swelling, orthopnea, palpitations and paroxysmal nocturnal dyspnea.   Respiratory: Negative for cough, shortness of breath and wheezing.    Skin: Negative for dry skin and poor wound healing.   Musculoskeletal: Negative for back pain, joint swelling and myalgias.    Gastrointestinal: Negative for abdominal pain, nausea and vomiting.   Genitourinary: Negative for hematuria.   Neurological: Negative for dizziness, headaches, numbness and seizures.   Psychiatric/Behavioral: Negative for altered mental status and depression.     Objective:     Vital Signs (Most Recent):  Temp: 98 °F (36.7 °C) (03/08/19 0936)  Pulse: 87 (03/08/19 1406)  Resp: 18(standing) (03/08/19 1007)  BP: 135/74 (03/08/19 1406)  SpO2: 100 % (03/08/19 1332) Vital Signs (24h Range):  Temp:  [98 °F (36.7 °C)] 98 °F (36.7 °C)  Pulse:  [67-87] 87  Resp:  [18-20] 18  SpO2:  [98 %-100 %] 100 %  BP: (127-167)/(64-93) 135/74     Weight: 120.2 kg (265 lb)  Body mass index is 39.13 kg/m².    SpO2: 100 %  O2 Device (Oxygen Therapy): room air      Intake/Output Summary (Last 24 hours) at 3/8/2019 1645  Last data filed at 3/8/2019 1204  Gross per 24 hour   Intake 500 ml   Output --   Net 500 ml       Lines/Drains/Airways     Peripheral Intravenous Line                 Peripheral IV - Single Lumen 03/08/19 1000 Right Antecubital less than 1 day                Physical Exam   Constitutional: She is oriented to person, place, and time. She appears well-developed and well-nourished. No distress.   HENT:   Head: Normocephalic and atraumatic.   Mouth/Throat: No oropharyngeal exudate.   Eyes: Conjunctivae and EOM are normal. Pupils are equal, round, and reactive to light. No scleral icterus.   Neck: Normal range of motion. Neck supple. No JVD present. No tracheal deviation present. No thyromegaly present.   Cardiovascular: Normal rate, regular rhythm, S1 normal and S2 normal. Exam reveals no gallop and no friction rub.   No murmur heard.  Pulmonary/Chest: Effort normal and breath sounds normal. No respiratory distress. She has no wheezes. She has no rales. She exhibits no tenderness.   Abdominal: Soft. She exhibits no distension.   Musculoskeletal: Normal range of motion. She exhibits no edema.   Neurological: She is alert and  oriented to person, place, and time. No cranial nerve deficit.   Skin: Skin is warm and dry. She is not diaphoretic.   Psychiatric: She has a normal mood and affect. Her behavior is normal. Judgment normal.       Current Medications:      sodium chloride 0.9%    Laboratory (all labs reviewed):  CBC:  Recent Labs   Lab 08/17/18  1005 03/08/19  1001   WHITE BLOOD CELL COUNT 4.72 4.14   HEMOGLOBIN 11.7 L 10.4 L   HEMATOCRIT 36.5 L 33.2 L   PLATELETS 316 281       CHEMISTRIES:  Recent Labs   Lab 08/17/18  1005 03/08/19  1001   GLUCOSE 89 95   SODIUM 136 137   POTASSIUM 4.0 3.6   BUN BLD 9 8   CREATININE 0.8 0.7   EGFR IF  >60 >60   EGFR IF NON- >60 >60   CALCIUM 9.4 8.7   MAGNESIUM  --  1.8       CARDIAC BIOMARKERS:  Recent Labs   Lab 08/17/18  1005 03/08/19  1001   TROPONIN I <0.006 <0.006       COAGS:        LIPIDS/LFTS:  Recent Labs   Lab 08/17/18  1005 03/08/19  1001   AST 20 19   ALT 18 16       BNP:  Recent Labs   Lab 03/08/19  1001   BNP <10       TSH:        Free T4:        Diagnostic Results:  ECG (personally reviewed and interpreted tracing(s)):  3/8/19 1001 SR 70    Chest X-Ray (personally reviewed and interpreted image(s)): 3/8/19 NAD    CTA Chest 3/8/19  No convincing filling defect to indicate a pulmonary embolus.  The right upper lobe pulmonary artery is partially obscured by artifact from the high-density contrast in the SVC.  Soft tissue prominence in the subcarinal region either the esophagus or subcarinal node.    Echo: ordered

## 2019-03-08 NOTE — HOSPITAL COURSE
Fany Fontenot 44 y.o. female placed in observation for syncope. In the ED, H&H 10.4&33.2, d-dimer 0.74, troponin negative, UA without nitrites or leukocytes, chest x-ray limited due to body habitus, but no consolidations, no PE on CTA. Echo with mildly depressed left ventricular function. Orthostatic negative. Cardiology evaluated patient and recommended outpatient stress test and follow up in clinic. She had no further episodes of chest pain or SOB. She was cleared for discharge by cardiology with no arrhythmia events overnight on telemetry. The syncope was mostly likely neurocardiogenic in origin, as it was associated with some nausea.  At discharge, patient was stable without complaint. She will follow up with [place] clinic for further cardiology evaluation.

## 2019-03-08 NOTE — NURSING
Patient arrived to floor in stable condition. Placed patient on the monitor. Visualized patient and assessed patient's overall condition and appearance. NAD noted. Will continue to monitor.

## 2019-03-08 NOTE — ED TRIAGE NOTES
Pt arrives to er via personal vehicle with family from home [id number]. Pt states around 0600 this am, she felt dizzy and passed out hitting her head and back. Pt states has been having chest pain on and off for past 2 weeks, sob, dizziness. Denies numbness/tingling, chest pain at this time.

## 2019-03-08 NOTE — H&P
"[facility] Medicine  History & Physical    Patient Name: Fany Fontenot  MRN: [id number]  Admission Date: 3/8/2019  Attending Physician: Trisha Ro MD   Primary Care Provider: Randa Starr MD         Patient information was obtained from patient, past medical records and ER records.     Subjective:     Principal Problem:Syncope    Chief Complaint:   Chief Complaint   Patient presents with    Loss of Consciousness     "I passed out this morning. I just fell on floor on carpet." emesis 2 days ago, reports nausea        HPI: Fany Fontenot 44 y.o. female presents to the hospital with a chief complaint of syncope. She reports today she was walking downstairs when she experienced a syncopal episode. She reports she was dizzy/lightheaded before the syncopal episode. She believes she was down for a few minutes. She reports she landed on her lower back. She has been experiencing intermittent sharp chest pains caused with exertion for the last 6 months. Today she experienced similar chest pain while at rest, that then resolved on its own. She finds for the last 6 months she has had progressively worsening SOB. She now becomes SOB within walking 1 flight of stairs. She denies chest pain or SOB with the syncopal episode today and denies any symptoms now. Her mother and her mother's aunts had heart disease. Her aunts  in their 50's from heart disease. Her mother  in her 60's. She endorses syncope, nausea, dizziness, SOB, chest pain. She denies vomiting, diarrhea, constipation, dysuria, orthopnea, and lower extremity edema.    In the ED, H&H 10.4&33.2, d-dimer 0.74, troponin negative, UA without nitrites or leukocytes, chest x-ray limited due to body habitus, but no consolidations, no PE on CTA.    Past Medical History:   Diagnosis Date    Anemia     Bronchitis        Past Surgical History:   Procedure Laterality Date     SECTION      DEBRIDEMENT-CARPAL Right 2015    " Performed by [facility] at [facility] OR    HAND SURGERY      MYOMECTOMY      PINNING-PERCUTANEOUS-FINGER Right 4/1/2015    Performed by [facility]ai Ha MD at [facility] OR       Review of patient's allergies indicates:  No Known Allergies    No current facility-administered medications on file prior to encounter.      Current Outpatient Medications on File Prior to Encounter   Medication Sig    ibuprofen (ADVIL,MOTRIN) 600 MG tablet Take 1 tablet (600 mg total) by mouth every 6 (six) hours as needed for Pain.     Family History     Problem Relation (Age of Onset)    Breast cancer Maternal Grandmother    Diabetes Maternal Grandmother, Mother, Sister    Hypertension Mother    Stroke Maternal Grandmother        Tobacco Use    Smoking status: Never Smoker    Smokeless tobacco: Never Used   Substance and Sexual Activity    Alcohol use: Yes     Comment: socially    Drug use: No    Sexual activity: Yes     Partners: Male     Birth control/protection: None     Review of Systems   Constitutional: Negative for chills and fever.   HENT: Negative for congestion, nosebleeds and tinnitus.    Eyes: Negative for photophobia and visual disturbance.   Respiratory: Positive for shortness of breath. Negative for wheezing.    Cardiovascular: Positive for chest pain. Negative for palpitations and leg swelling.   Gastrointestinal: Positive for nausea. Negative for abdominal distention, abdominal pain and vomiting.   Genitourinary: Negative for dysuria, flank pain and hematuria.   Musculoskeletal: Negative for gait problem and joint swelling.   Skin: Negative for rash and wound.   Neurological: Positive for syncope. Negative for seizures.     Objective:     Vital Signs (Most Recent):  Temp: 98 °F (36.7 °C) (03/08/19 0936)  Pulse: 87 (03/08/19 1406)  Resp: 18(standing) (03/08/19 1007)  BP: 135/74 (03/08/19 1406)  SpO2: 100 % (03/08/19 1332) Vital Signs (24h Range):  Temp:  [98 °F (36.7 °C)] 98 °F (36.7 °C)  Pulse:  [67-87]  87  Resp:  [18-20] 18  SpO2:  [98 %-100 %] 100 %  BP: (127-167)/(64-93) 135/74     Weight: 120.2 kg (265 lb)  Body mass index is 39.13 kg/m².    Physical Exam   Constitutional: She is oriented to person, place, and time. She appears well-developed and well-nourished. No distress.   HENT:   Head: Normocephalic and atraumatic.   Right Ear: External ear normal.   Left Ear: External ear normal.   No scalp tenderness   Eyes: Conjunctivae and EOM are normal. Right eye exhibits no discharge. Left eye exhibits no discharge.   Neck: Normal range of motion. No thyromegaly present.   Full ROM no tenderness of neck   Cardiovascular: Normal rate and regular rhythm.   No murmur heard.  Pulmonary/Chest: Effort normal and breath sounds normal. No respiratory distress.   Abdominal: Soft. Bowel sounds are normal. She exhibits no distension and no mass. There is no tenderness.   Musculoskeletal: She exhibits no edema or deformity.   No tenderness of cervical, thoracic, or lumbar spine. No tenderess of hips.   Neurological: She is alert and oriented to person, place, and time. No sensory deficit. She exhibits normal muscle tone.   Finger to nose intact, heel to shin intact, thumb to finger intact, 5/5 strength upper and lower extremities   Skin: Skin is warm and dry.   Psychiatric: She has a normal mood and affect. Her behavior is normal.   Nursing note and vitals reviewed.        CRANIAL NERVES     CN III, IV, VI   Extraocular motions are normal.        Significant Labs:   CBC:   Recent Labs   Lab 03/08/19  1001   WBC 4.14   HGB 10.4*   HCT 33.2*        CMP:   Recent Labs   Lab 03/08/19  1001      K 3.6      CO2 26   GLU 95   BUN 8   CREATININE 0.7   CALCIUM 8.7   PROT 7.9   ALBUMIN 3.4*   BILITOT 0.3   ALKPHOS 71   AST 19   ALT 16   ANIONGAP 5*   EGFRNONAA >60     Cardiac Markers:   Recent Labs   Lab 03/08/19  1001   BNP <10     Troponin:   Recent Labs   Lab 03/08/19  1001   TROPONINI <0.006     Urine Studies:    Recent Labs   Lab 03/08/19  1135   COLORU Straw   APPEARANCEUA Clear   PHUR 7.0   SPECGRAV 1.010   PROTEINUA Negative   GLUCUA Negative   KETONESU Negative   BILIRUBINUA Negative   OCCULTUA 3+*   NITRITE Negative   UROBILINOGEN Negative   LEUKOCYTESUR Negative   RBCUA 75*   WBCUA 4       Significant Imaging:   Imaging Results          X-Ray Chest AP Portable (Final result)  Result time 03/08/19 13:11:38    Final result by Sean Goff MD (03/08/19 13:11:38)                 Impression:      1. Interstitial findings are likely related to habitus and shallow inspiratory effort, no large focal consolidation.      Electronically signed by: Sean Goff MD  Date:    03/08/2019  Time:    13:11             Narrative:    EXAMINATION:  XR CHEST AP PORTABLE    CLINICAL HISTORY:  Chest Pain;    TECHNIQUE:  Single frontal view of the chest was performed.    COMPARISON:  CT 03/08/2019, radiograph 08/17/2018    FINDINGS:  The cardiomediastinal silhouette is not enlarged, magnified by technique..  There is no pleural effusion.  The trachea is midline.  The lungs are symmetrically expanded bilaterally with mildly coarse interstitial attenuation.  No large focal consolidation seen.  There is no pneumothorax.  The osseous structures are unremarkable.                               CTA Chest Non-Coronary (PE Study) (Final result)  Result time 03/08/19 12:36:21    Final result by Antonio Bui Jr., MD (03/08/19 12:36:21)                 Impression:      No convincing filling defect to indicate a pulmonary embolus.  The right upper lobe pulmonary artery is partially obscured by artifact from the high-density contrast in the SVC.    Soft tissue prominence in the subcarinal region either the esophagus or subcarinal node.      Electronically signed by: Antonio Bui MD  Date:    03/08/2019  Time:    12:36             Narrative:    EXAMINATION:  CTA CHEST NON CORONARY    CLINICAL HISTORY:  Chest pain, acute, PE suspected,  intermed prob, positive D-dimer;Chest pain with SOB and syncope. D-dimer 0.74;    TECHNIQUE:  Low dose axial images, sagittal and coronal reformations were obtained from the thoracic inlet to the lung bases following the IV administration of 75 mL of Omnipaque 350.  Contrast timing was optimized to evaluate the pulmonary arteries.  MIP images were performed.    COMPARISON:  None    FINDINGS:  There is fair opacification of the pulmonary vessels.  No convincing filling defect.  Unfortunately there is some artifact from the SVC partially obscuring the proximal right upper lobe pulmonary artery at the hilum.  Otherwise no convincing filling defects identified.  Some soft tissue prominence in the subcarinal region presumably the esophagus.  Subcarinal node not excluded.  No additional mediastinal or hilar nodes.  No significant pleural or pericardial fluid.  Aorta and great vessels are patent.    In the upper abdomen no significant abnormality seen.    Evaluation of the lungs demonstrate some respiratory motion artifact.  No confluent consolidation.  The airway is patent.    Bone windows demonstrate no lytic or blastic lesions.                                  Assessment/Plan:     * Syncope    EKG of NSR, no PE seen on CTA, troponin negative. Patient reports intermittent episodes of chest pain and SOB worsened with exertion for the last 6 months, and family history of cardiac disease.  -Troponin trend  -Telemetry  -Echo  -TSH/Lipid  -cardiology consult  -CT head     Precordial pain    Cardiology consulted.  See above         VTE Risk Mitigation (From admission, onward)        Ordered     enoxaparin injection 40 mg  Daily      03/08/19 0502        VTE:  Code: full  Diet: regular  Dispo: pending cardiology eval and echo    Antonio Galvan PA-C  Department of Hospital Medicine   [facility]

## 2019-03-08 NOTE — ASSESSMENT & PLAN NOTE
Symptoms seem most consistent with a neurocardiogenic etiology and the pt describes 1 week of nausea leading up to this suggest dehydration as well.  EKG is normal, trop x1 is normal and LV fxn is only mildly diminished.  CTA neg for PE.  I doubt ACS or malignant arrhythmia.  Check postural VS.  Hydrate.  Repeat trop.  Assuming no significant elev and no arrhythmia overnight, dispo planning for outpatient cardiac stress testing is appropriate.

## 2019-03-08 NOTE — SUBJECTIVE & OBJECTIVE
Past Medical History:   Diagnosis Date    Anemia     Bronchitis        Past Surgical History:   Procedure Laterality Date     SECTION      DEBRIDEMENT-CARPAL Right 2015    Performed by [facility]ai Ha MD at [facility] OR    HAND SURGERY      MYOMECTOMY      PINNING-PERCUTANEOUS-FINGER Right 2015    Performed by Nikolia Ha MD at [facility] OR       Review of patient's allergies indicates:  No Known Allergies    No current facility-administered medications on file prior to encounter.      Current Outpatient Medications on File Prior to Encounter   Medication Sig    ibuprofen (ADVIL,MOTRIN) 600 MG tablet Take 1 tablet (600 mg total) by mouth every 6 (six) hours as needed for Pain.     Family History     Problem Relation (Age of Onset)    Breast cancer Maternal Grandmother    Diabetes Maternal Grandmother, Mother, Sister    Hypertension Mother    Stroke Maternal Grandmother        Tobacco Use    Smoking status: Never Smoker    Smokeless tobacco: Never Used   Substance and Sexual Activity    Alcohol use: Yes     Comment: socially    Drug use: No    Sexual activity: Yes     Partners: Male     Birth control/protection: None     Review of Systems   Constitutional: Negative for chills and fever.   HENT: Negative for congestion, nosebleeds and tinnitus.    Eyes: Negative for photophobia and visual disturbance.   Respiratory: Positive for shortness of breath. Negative for wheezing.    Cardiovascular: Positive for chest pain. Negative for palpitations and leg swelling.   Gastrointestinal: Positive for nausea. Negative for abdominal distention, abdominal pain and vomiting.   Genitourinary: Negative for dysuria, flank pain and hematuria.   Musculoskeletal: Negative for gait problem and joint swelling.   Skin: Negative for rash and wound.   Neurological: Positive for syncope. Negative for seizures.     Objective:     Vital Signs (Most Recent):  Temp: 98 °F (36.7 °C) (19 0936)  Pulse: 87  (03/08/19 1406)  Resp: 18(standing) (03/08/19 1007)  BP: 135/74 (03/08/19 1406)  SpO2: 100 % (03/08/19 1332) Vital Signs (24h Range):  Temp:  [98 °F (36.7 °C)] 98 °F (36.7 °C)  Pulse:  [67-87] 87  Resp:  [18-20] 18  SpO2:  [98 %-100 %] 100 %  BP: (127-167)/(64-93) 135/74     Weight: 120.2 kg (265 lb)  Body mass index is 39.13 kg/m².    Physical Exam   Constitutional: She is oriented to person, place, and time. She appears well-developed and well-nourished. No distress.   HENT:   Head: Normocephalic and atraumatic.   Right Ear: External ear normal.   Left Ear: External ear normal.   No scalp tenderness   Eyes: Conjunctivae and EOM are normal. Right eye exhibits no discharge. Left eye exhibits no discharge.   Neck: Normal range of motion. No thyromegaly present.   Full ROM no tenderness of neck   Cardiovascular: Normal rate and regular rhythm.   No murmur heard.  Pulmonary/Chest: Effort normal and breath sounds normal. No respiratory distress.   Abdominal: Soft. Bowel sounds are normal. She exhibits no distension and no mass. There is no tenderness.   Musculoskeletal: She exhibits no edema or deformity.   No tenderness of cervical, thoracic, or lumbar spine. No tenderess of hips.   Neurological: She is alert and oriented to person, place, and time. No sensory deficit. She exhibits normal muscle tone.   Finger to nose intact, heel to shin intact, thumb to finger intact, 5/5 strength upper and lower extremities   Skin: Skin is warm and dry.   Psychiatric: She has a normal mood and affect. Her behavior is normal.   Nursing note and vitals reviewed.        CRANIAL NERVES     CN III, IV, VI   Extraocular motions are normal.        Significant Labs:   CBC:   Recent Labs   Lab 03/08/19  1001   WBC 4.14   HGB 10.4*   HCT 33.2*        CMP:   Recent Labs   Lab 03/08/19  1001      K 3.6      CO2 26   GLU 95   BUN 8   CREATININE 0.7   CALCIUM 8.7   PROT 7.9   ALBUMIN 3.4*   BILITOT 0.3   ALKPHOS 71   AST 19    ALT 16   ANIONGAP 5*   EGFRNONAA >60     Cardiac Markers:   Recent Labs   Lab 03/08/19  1001   BNP <10     Troponin:   Recent Labs   Lab 03/08/19  1001   TROPONINI <0.006     Urine Studies:   Recent Labs   Lab 03/08/19  1135   COLORU Straw   APPEARANCEUA Clear   PHUR 7.0   SPECGRAV 1.010   PROTEINUA Negative   GLUCUA Negative   KETONESU Negative   BILIRUBINUA Negative   OCCULTUA 3+*   NITRITE Negative   UROBILINOGEN Negative   LEUKOCYTESUR Negative   RBCUA 75*   WBCUA 4       Significant Imaging:   Imaging Results          X-Ray Chest AP Portable (Final result)  Result time 03/08/19 13:11:38    Final result by Sean Goff MD (03/08/19 13:11:38)                 Impression:      1. Interstitial findings are likely related to habitus and shallow inspiratory effort, no large focal consolidation.      Electronically signed by: Sean Goff MD  Date:    03/08/2019  Time:    13:11             Narrative:    EXAMINATION:  XR CHEST AP PORTABLE    CLINICAL HISTORY:  Chest Pain;    TECHNIQUE:  Single frontal view of the chest was performed.    COMPARISON:  CT 03/08/2019, radiograph 08/17/2018    FINDINGS:  The cardiomediastinal silhouette is not enlarged, magnified by technique..  There is no pleural effusion.  The trachea is midline.  The lungs are symmetrically expanded bilaterally with mildly coarse interstitial attenuation.  No large focal consolidation seen.  There is no pneumothorax.  The osseous structures are unremarkable.                               CTA Chest Non-Coronary (PE Study) (Final result)  Result time 03/08/19 12:36:21    Final result by Antonio Bui Jr., MD (03/08/19 12:36:21)                 Impression:      No convincing filling defect to indicate a pulmonary embolus.  The right upper lobe pulmonary artery is partially obscured by artifact from the high-density contrast in the SVC.    Soft tissue prominence in the subcarinal region either the esophagus or subcarinal  node.      Electronically signed by: Antonio Bui MD  Date:    03/08/2019  Time:    12:36             Narrative:    EXAMINATION:  CTA CHEST NON CORONARY    CLINICAL HISTORY:  Chest pain, acute, PE suspected, intermed prob, positive D-dimer;Chest pain with SOB and syncope. D-dimer 0.74;    TECHNIQUE:  Low dose axial images, sagittal and coronal reformations were obtained from the thoracic inlet to the lung bases following the IV administration of 75 mL of Omnipaque 350.  Contrast timing was optimized to evaluate the pulmonary arteries.  MIP images were performed.    COMPARISON:  None    FINDINGS:  There is fair opacification of the pulmonary vessels.  No convincing filling defect.  Unfortunately there is some artifact from the SVC partially obscuring the proximal right upper lobe pulmonary artery at the hilum.  Otherwise no convincing filling defects identified.  Some soft tissue prominence in the subcarinal region presumably the esophagus.  Subcarinal node not excluded.  No additional mediastinal or hilar nodes.  No significant pleural or pericardial fluid.  Aorta and great vessels are patent.    In the upper abdomen no significant abnormality seen.    Evaluation of the lungs demonstrate some respiratory motion artifact.  No confluent consolidation.  The airway is patent.    Bone windows demonstrate no lytic or blastic lesions.

## 2019-03-08 NOTE — ED PROVIDER NOTES
"Encounter Date: 3/8/2019    SCRIBE #1 NOTE: I, Adam Can, am scribing for, and in the presence of,  Gucci Perez MD . I have scribed the following portions of the note - Other sections scribed: HPI, ROS.       History     Chief Complaint   Patient presents with    Loss of Consciousness     "I passed out this morning. I just fell on floor on carpet." emesis 2 days ago, reports nausea     CC: LOC    HPI: 43 y/o F who has a past medical history of Anemia and Bronchitis presents to the ED with acute onset of a syncopal episode which occurred this morning when the pt was standing getting dressed for work. Pt reports feeling faint before falling. She remembers falling backwards and remembers waking up on the ground. Pt has associated lower back pain due to her fall. Pt also complains of L chest pain which began intermittently one day ago. Pt describes the pain as non-radiating and sharp. The pain last twenty seconds. Her chest pain re-occurred this morning at 0230. Currently, she does not have chest pain or shortness of breath. Pt states it is a possibility that she could be pregnant. Her LMP: 3/8. Her periods started becoming irregular eight months ago. Her menstrual cycle is usually heavy and she has been told she was anemic in the past. Pt denies any new leg swellings. No fever/chills, nausea/emesis, abominal pain, cough/sob, headache/dizziness, generalized numbness, urinary symptoms, abnormal bowels.         The history is provided by the patient. No  was used.     Review of patient's allergies indicates:  No Known Allergies  Past Medical History:   Diagnosis Date    Anemia     Bronchitis     Syncope 2019     Past Surgical History:   Procedure Laterality Date     SECTION  2008    DEBRIDEMENT OF CARPAL BONE Right 2015    long finger, s/p traumatic injury    DEBRIDEMENT-CARPAL Right 2015    [facility]    MYOMECTOMY  " 2007    PERCUTANEOUS PINNING OF FINGER Right 04/01/2015    long finger, s/p traumatic injury    PINNING-PERCUTANEOUS-FINGER Right 4/1/2015    [facility] at [facility]     Family History   Problem Relation Age of Onset    Stroke Maternal Grandmother     Diabetes Maternal Grandmother     Breast cancer Maternal Grandmother     Diabetes Mother     Hypertension Mother     Diabetes Sister     Colon cancer Neg Hx     Ovarian cancer Neg Hx      Social History     Tobacco Use    Smoking status: Never Smoker    Smokeless tobacco: Never Used   Substance Use Topics    Alcohol use: Yes     Comment: socially    Drug use: No     Review of Systems   Constitutional: Negative for appetite change and fever.   HENT: Negative for rhinorrhea and sore throat.    Eyes: Negative for visual disturbance.   Respiratory: Negative for cough and shortness of breath.    Cardiovascular: Positive for chest pain (Left).   Gastrointestinal: Positive for nausea. Negative for abdominal pain.   Genitourinary: Negative for dysuria.   Musculoskeletal: Positive for back pain (Lower). Negative for gait problem.   Skin: Negative for rash.   Neurological: Positive for syncope. Negative for seizures.       Physical Exam     Initial Vitals [03/08/19 0936]   BP Pulse Resp Temp SpO2   133/84 75 20 98 °F (36.7 °C) 100 %      MAP       --         Physical Exam    Nursing note and vitals reviewed.  Constitutional: She appears well-developed and well-nourished.   HENT:   Mouth/Throat: Oropharynx is clear and moist.   Eyes: EOM are normal. Pupils are equal, round, and reactive to light.   Neck: Normal range of motion. Neck supple. No thyromegaly present. No JVD present.   Cardiovascular: Normal rate, regular rhythm, normal heart sounds and intact distal pulses. Exam reveals no gallop and no friction rub.    No murmur heard.  Pulmonary/Chest: Breath sounds normal. No respiratory distress.   Abdominal: Soft. Bowel sounds are normal.  She exhibits no distension and no mass. There is no tenderness. There is no rebound and no guarding.   Musculoskeletal: Normal range of motion. She exhibits no edema or tenderness.   Neurological: She is alert and oriented to person, place, and time. She has normal strength and normal reflexes. She displays normal reflexes. No cranial nerve deficit or sensory deficit. GCS score is 15. GCS eye subscore is 4. GCS verbal subscore is 5. GCS motor subscore is 6.   Skin: Skin is warm and dry. Capillary refill takes less than 2 seconds. No rash noted.         ED Course   Procedures  Labs Reviewed   CBC W/ AUTO DIFFERENTIAL - Abnormal; Notable for the following components:       Result Value    Hemoglobin 10.4 (*)     Hematocrit 33.2 (*)     MCV 78 (*)     MCH 24.3 (*)     MCHC 31.3 (*)     RDW 17.4 (*)     Gran # (ANC) 1.5 (*)     Gran% 36.0 (*)     Lymph% 53.6 (*)     All other components within normal limits   COMPREHENSIVE METABOLIC PANEL - Abnormal; Notable for the following components:    Albumin 3.4 (*)     Anion Gap 5 (*)     All other components within normal limits   URINALYSIS, REFLEX TO URINE CULTURE - Abnormal; Notable for the following components:    Occult Blood UA 3+ (*)     All other components within normal limits    Narrative:     Preferred Collection Type->Urine, Clean Catch   D DIMER, QUANTITATIVE - Abnormal; Notable for the following components:    D-Dimer 0.74 (*)     All other components within normal limits   URINALYSIS MICROSCOPIC - Abnormal; Notable for the following components:    RBC, UA 75 (*)     All other components within normal limits    Narrative:     Preferred Collection Type->Urine, Clean Catch   TROPONIN I   B-TYPE NATRIURETIC PEPTIDE   MAGNESIUM   POCT URINE PREGNANCY     EKG Readings: (Independently Interpreted)   Initial Reading: No STEMI. Rhythm: Normal Sinus Rhythm. Heart Rate: 70. Ectopy: No Ectopy. Conduction: Normal. ST Segments: Normal ST Segments.     ECG Results          EKG  12-lead (Final result)  Result time 03/08/19 20:53:55    Final result by Interface, Lab [facility] (03/08/19 20:53:55)                 Narrative:    Test Reason : R55,    Vent. Rate : 070 BPM     Atrial Rate : 070 BPM     P-R Int : 178 ms          QRS Dur : 086 ms      QT Int : 426 ms       P-R-T Axes : 047 080 051 degrees     QTc Int : 460 ms    Normal sinus rhythm  Normal ECG  When compared with ECG of 17-AUG-2018 09:55,  Significant changes have occurred  Confirmed by Armen Love MD ([id number]) on 3/8/2019 8:53:42 PM    Referred By: System System           Confirmed By:Armen Love MD                             EKG 12-LEAD (Final result)  Result time 03/18/19 13:50:23    Final result by Unknown User (03/18/19 13:50:23)                                Imaging Results          US Lower Extremity Veins Bilateral (Final result)  Result time 03/08/19 19:03:52    Final result by Tracey Ennis MD (03/08/19 19:03:52)                 Impression:      No evidence of deep venous thrombosis in either lower extremity.      Electronically signed by: Tracey Ennis  Date:    03/08/2019  Time:    19:03             Narrative:    EXAMINATION:  ULTRASOUND LOWER EXTREMITY VEINS BILATERAL    CLINICAL HISTORY:  elevated d-dimer;    TECHNIQUE:  Duplex and color flow Doppler and dynamic compression was performed of the bilateral lower extremity veins was performed.    COMPARISON:  None    FINDINGS:  Right thigh veins: The common femoral, femoral, popliteal, upper greater saphenous, and deep femoral veins are patent and free of thrombus. The veins are normally compressible and have normal phasic flow and augmentation response.    Right calf veins: The visualized calf veins are patent.    Left thigh veins: The common femoral, femoral, popliteal, upper greater saphenous, and deep femoral veins are patent and free of thrombus. The veins are normally compressible and have normal phasic flow and augmentation response.    Left calf  veins: The visualized calf veins are patent.    Miscellaneous: None                               CT Head Without Contrast (Final result)  Result time 03/08/19 17:33:58    Final result by Tracey Ennis MD (03/08/19 17:33:58)                 Impression:      No acute intracranial abnormality detected.      Electronically signed by: Tracey Ennis  Date:    03/08/2019  Time:    17:33             Narrative:    EXAMINATION:  CT OF THE HEAD WITHOUT    TECHNIQUE:  5 mm unenhanced axial images were obtained from the skull base to the vertex.    FINDINGS:  The ventricles, basal cisterns, and cortical sulci are within normal limits for patient's stated age. There is no acute intracranial hemorrhage, territorial infarct or mass effect, or midline shift. The visualized paranasal sinuses and mastoid air cells are clear.                               X-Ray Chest AP Portable (Final result)  Result time 03/08/19 13:11:38    Final result by Sean Goff MD (03/08/19 13:11:38)                 Impression:      1. Interstitial findings are likely related to habitus and shallow inspiratory effort, no large focal consolidation.      Electronically signed by: Sean Goff MD  Date:    03/08/2019  Time:    13:11             Narrative:    EXAMINATION:  XR CHEST AP PORTABLE    CLINICAL HISTORY:  Chest Pain;    TECHNIQUE:  Single frontal view of the chest was performed.    COMPARISON:  CT 03/08/2019, radiograph 08/17/2018    FINDINGS:  The cardiomediastinal silhouette is not enlarged, magnified by technique..  There is no pleural effusion.  The trachea is midline.  The lungs are symmetrically expanded bilaterally with mildly coarse interstitial attenuation.  No large focal consolidation seen.  There is no pneumothorax.  The osseous structures are unremarkable.                               CTA Chest Non-Coronary (PE Study) (Final result)  Result time 03/08/19 12:36:21    Final result by Antonio Bui Jr., MD (03/08/19  12:36:21)                 Impression:      No convincing filling defect to indicate a pulmonary embolus.  The right upper lobe pulmonary artery is partially obscured by artifact from the high-density contrast in the SVC.    Soft tissue prominence in the subcarinal region either the esophagus or subcarinal node.      Electronically signed by: Antonio Bui MD  Date:    03/08/2019  Time:    12:36             Narrative:    EXAMINATION:  CTA CHEST NON CORONARY    CLINICAL HISTORY:  Chest pain, acute, PE suspected, intermed prob, positive D-dimer;Chest pain with SOB and syncope. D-dimer 0.74;    TECHNIQUE:  Low dose axial images, sagittal and coronal reformations were obtained from the thoracic inlet to the lung bases following the IV administration of 75 mL of Omnipaque 350.  Contrast timing was optimized to evaluate the pulmonary arteries.  MIP images were performed.    COMPARISON:  None    FINDINGS:  There is fair opacification of the pulmonary vessels.  No convincing filling defect.  Unfortunately there is some artifact from the SVC partially obscuring the proximal right upper lobe pulmonary artery at the hilum.  Otherwise no convincing filling defects identified.  Some soft tissue prominence in the subcarinal region presumably the esophagus.  Subcarinal node not excluded.  No additional mediastinal or hilar nodes.  No significant pleural or pericardial fluid.  Aorta and great vessels are patent.    In the upper abdomen no significant abnormality seen.    Evaluation of the lungs demonstrate some respiratory motion artifact.  No confluent consolidation.  The airway is patent.    Bone windows demonstrate no lytic or blastic lesions.                              X-Rays:   Independently Interpreted Readings:   Head CT: No hemorrhage.  No skull fracture.  No acute stroke.       patient had chest pain associated with syncope.  EKG normal. Troponin negative.  Patient's D-dimer was minimally elevated.  However no evidence of  PE on CT a of the chest and no evidence of DVT on lower extremity ultrasound.  Due to chest pain associated with syncope will ob overnight.          Scribe Attestation:   Scribe #1: I performed the above scribed service and the documentation accurately describes the services I performed. I attest to the accuracy of the note.    Attending Attestation:           Physician Attestation for Scribe:  Physician Attestation Statement for Scribe #1: I, Gucci Perez MD , reviewed documentation, as scribed by Adam Can  in my presence, and it is both accurate and complete.                    Clinical Impression:       ICD-10-CM ICD-9-CM   1. Syncope R55 780.2   2. Chest pain, unspecified type R07.9 786.50   3. FITZGERALD (dyspnea on exertion) R06.09 786.09   4. Chest pain R07.9 786.50   5. Precordial pain R07.2 786.51                                Gucci Perez MD  04/05/19 0907

## 2019-03-08 NOTE — HPI
44 y.o. female presents to the hospital with a chief complaint of syncope. She reports today she was walking downstairs when she experienced a syncopal episode. She reports she was dizzy/lightheaded before the syncopal episode. She believes she was down for a few minutes. She reports she landed on her lower back. She has been experiencing intermittent sharp chest pains caused with exertion for the last 6 months. Today she experienced similar chest pain while at rest, that then resolved on its own. She finds for the last 6 months she has had progressively worsening SOB. She now becomes SOB within walking 1 flight of stairs. She denies chest pain or SOB with the syncopal episode today and denies any symptoms now. Her mother and her mother's aunts had heart disease. Her aunts  in their 50's from heart disease. Her mother  in her 60's. She endorses syncope, nausea, dizziness, SOB, chest pain. She denies vomiting, diarrhea, constipation, dysuria, orthopnea, and lower extremity edema.  In the ED, H&H 10.4&33.2, d-dimer 0.74, troponin negative, UA without nitrites or leukocytes, chest x-ray limited due to body habitus, but no consolidations, no PE on CTA.    The patient is a very pleasant 44-year-old  woman with no prior cardiovascular or medical history on no medications, although she does have a family history of premature CAD.  She presented to the emergency room with a syncopal episode.  She describes approximately 1 week of nausea and dehydration.  This morning, after wakening and going down to her kitchen, she felt herself getting lightheaded.  She tried to make her way back up stairs to her bed, but apparently lost consciousness.  She fell backwards and thinks she may have hit her head and was on the ground for approximately 15 min.  She awoke after that feeling somewhat groggy.  Prior to passing out she did feel some nausea and diaphoresis.  She did not describe any chest pain or  palpitations.  In addition to the symptoms, the patient does describe dyspnea on exertion, as well as exertional chest discomfort consistent with angina.  She denies any chest discomfort at rest.  She otherwise has had no PND, orthopnea, or lower extremity edema.  She denies any melena, hematuria, or claudicant symptoms.    Cardiac workup so far has included a normal electrocardiogram, and negative troponin x1.  Her echo notes mild LV dysfunction, without wall motion abnormality.  There are no valvular abnormalities or pericardial effusion.

## 2019-03-08 NOTE — ASSESSMENT & PLAN NOTE
CP with FH premat CAD as only RF.  EKG normal  Echo with EF 45%  Will plan outpatient stress testing

## 2019-03-09 VITALS
BODY MASS INDEX: 40.04 KG/M2 | WEIGHT: 270.31 LBS | HEART RATE: 78 BPM | DIASTOLIC BLOOD PRESSURE: 60 MMHG | HEIGHT: 69 IN | TEMPERATURE: 98 F | RESPIRATION RATE: 18 BRPM | SYSTOLIC BLOOD PRESSURE: 114 MMHG | OXYGEN SATURATION: 98 %

## 2019-03-09 LAB
ALBUMIN SERPL BCP-MCNC: 2.9 G/DL
ALP SERPL-CCNC: 65 U/L
ALT SERPL W/O P-5'-P-CCNC: 15 U/L
ANION GAP SERPL CALC-SCNC: 6 MMOL/L
AST SERPL-CCNC: 15 U/L
BASOPHILS # BLD AUTO: 0 K/UL
BASOPHILS NFR BLD: 0 %
BILIRUB SERPL-MCNC: 0.1 MG/DL
BUN SERPL-MCNC: 8 MG/DL
CALCIUM SERPL-MCNC: 8.4 MG/DL
CHLORIDE SERPL-SCNC: 109 MMOL/L
CHOLEST SERPL-MCNC: 167 MG/DL
CHOLEST/HDLC SERPL: 4.5 {RATIO}
CO2 SERPL-SCNC: 25 MMOL/L
CREAT SERPL-MCNC: 0.7 MG/DL
DIFFERENTIAL METHOD: ABNORMAL
EOSINOPHIL # BLD AUTO: 0.1 K/UL
EOSINOPHIL NFR BLD: 2 %
ERYTHROCYTE [DISTWIDTH] IN BLOOD BY AUTOMATED COUNT: 17.3 %
EST. GFR  (AFRICAN AMERICAN): >60 ML/MIN/1.73 M^2
EST. GFR  (NON AFRICAN AMERICAN): >60 ML/MIN/1.73 M^2
GLUCOSE SERPL-MCNC: 137 MG/DL
HCT VFR BLD AUTO: 31.8 %
HDLC SERPL-MCNC: 37 MG/DL
HDLC SERPL: 22.2 %
HGB BLD-MCNC: 9.9 G/DL
LDLC SERPL CALC-MCNC: 91.8 MG/DL
LYMPHOCYTES # BLD AUTO: 2.7 K/UL
LYMPHOCYTES NFR BLD: 53.4 %
MCH RBC QN AUTO: 24.4 PG
MCHC RBC AUTO-ENTMCNC: 31.1 G/DL
MCV RBC AUTO: 79 FL
MONOCYTES # BLD AUTO: 0.3 K/UL
MONOCYTES NFR BLD: 5.9 %
NEUTROPHILS # BLD AUTO: 2 K/UL
NEUTROPHILS NFR BLD: 38.7 %
NONHDLC SERPL-MCNC: 130 MG/DL
PLATELET # BLD AUTO: 263 K/UL
PMV BLD AUTO: 9 FL
POTASSIUM SERPL-SCNC: 3.6 MMOL/L
PROT SERPL-MCNC: 7 G/DL
RBC # BLD AUTO: 4.05 M/UL
SODIUM SERPL-SCNC: 140 MMOL/L
TRIGL SERPL-MCNC: 191 MG/DL
TROPONIN I SERPL DL<=0.01 NG/ML-MCNC: <0.006 NG/ML
TSH SERPL DL<=0.005 MIU/L-ACNC: 1.3 UIU/ML
WBC # BLD AUTO: 5.11 K/UL

## 2019-03-09 PROCEDURE — 80053 COMPREHEN METABOLIC PANEL: CPT

## 2019-03-09 PROCEDURE — 99225 PR SUBSEQUENT OBSERVATION CARE,LEVEL II: CPT | Mod: ,,, | Performed by: INTERNAL MEDICINE

## 2019-03-09 PROCEDURE — 99225 PR SUBSEQUENT OBSERVATION CARE,LEVEL II: ICD-10-PCS | Mod: ,,, | Performed by: INTERNAL MEDICINE

## 2019-03-09 PROCEDURE — 80061 LIPID PANEL: CPT

## 2019-03-09 PROCEDURE — 84443 ASSAY THYROID STIM HORMONE: CPT

## 2019-03-09 PROCEDURE — 96361 HYDRATE IV INFUSION ADD-ON: CPT | Performed by: EMERGENCY MEDICINE

## 2019-03-09 PROCEDURE — 85025 COMPLETE CBC W/AUTO DIFF WBC: CPT

## 2019-03-09 PROCEDURE — 36415 COLL VENOUS BLD VENIPUNCTURE: CPT

## 2019-03-09 PROCEDURE — G0378 HOSPITAL OBSERVATION PER HR: HCPCS

## 2019-03-09 PROCEDURE — 84484 ASSAY OF TROPONIN QUANT: CPT

## 2019-03-09 NOTE — PROGRESS NOTES
[facility]  Cardiology  Progress Note    Patient Name: Fany Fontenot  MRN: [id number]  Admission Date: 3/8/2019  Hospital Length of Stay: 0 days  Code Status: Full Code   Attending Physician: Trisha Ro MD   Primary Care Physician: Randa Starr MD  Expected Discharge Date:   Principal Problem:Syncope    Subjective:     Interval Hx: no cp, sob, still occ LH, no LOC.  Case d/w D. Showtherese, PA    Tele: NSR, no arrhythmia (personally reviewed and interpreted)      Past Medical History:   Diagnosis Date    Anemia     Bronchitis     Syncope 2019       Past Surgical History:   Procedure Laterality Date     SECTION  2008    DEBRIDEMENT OF CARPAL BONE Right 2015    long finger, s/p traumatic injury    DEBRIDEMENT-CARPAL Right 2015    [facility] at [facility]    MYOMECTOMY  2007    PERCUTANEOUS PINNING OF FINGER Right 2015    long finger, s/p traumatic injury    PINNING-PERCUTANEOUS-FINGER Right 2015    Performed by [facility] at [facility] OR       Review of patient's allergies indicates:  No Known Allergies    No current facility-administered medications on file prior to encounter.      Current Outpatient Medications on File Prior to Encounter   Medication Sig    ibuprofen (ADVIL,MOTRIN) 600 MG tablet Take 1 tablet (600 mg total) by mouth every 6 (six) hours as needed for Pain.     Family History     Problem Relation (Age of Onset)    Breast cancer Maternal Grandmother    Diabetes Maternal Grandmother, Mother, Sister    Hypertension Mother    Stroke Maternal Grandmother        Tobacco Use    Smoking status: Never Smoker    Smokeless tobacco: Never Used   Substance and Sexual Activity    Alcohol use: Yes     Comment: socially    Drug use: No    Sexual activity: Yes     Partners: Male     Birth control/protection: None     Review of Systems   Gastrointestinal: Negative for melena.   Genitourinary: Negative for  hematuria.     Objective:     Vital Signs (Most Recent):  Temp: 97.7 °F (36.5 °C) (03/09/19 0736)  Pulse: 78 (03/09/19 0736)  Resp: 18 (03/09/19 0736)  BP: 114/60 (03/09/19 0736)  SpO2: 98 % (03/09/19 0736) Vital Signs (24h Range):  Temp:  [97.7 °F (36.5 °C)-98.7 °F (37.1 °C)] 97.7 °F (36.5 °C)  Pulse:  [66-87] 78  Resp:  [16-20] 18  SpO2:  [97 %-100 %] 98 %  BP: (110-167)/(58-95) 114/60     Weight: 122.6 kg (270 lb 4.5 oz)  Body mass index is 39.91 kg/m².    SpO2: 98 %  O2 Device (Oxygen Therapy): room air      Intake/Output Summary (Last 24 hours) at 3/9/2019 0919  Last data filed at 3/9/2019 0700  Gross per 24 hour   Intake 2240 ml   Output --   Net 2240 ml       Lines/Drains/Airways     Peripheral Intravenous Line                 Peripheral IV - Single Lumen 03/08/19 1000 Right Antecubital less than 1 day              Exam unchanged vs 3/8/19  Physical Exam   Constitutional: She is oriented to person, place, and time. She appears well-developed and well-nourished. No distress.   HENT:   Head: Normocephalic and atraumatic.   Mouth/Throat: No oropharyngeal exudate.   Eyes: Conjunctivae and EOM are normal. Pupils are equal, round, and reactive to light. No scleral icterus.   Neck: Normal range of motion. Neck supple. No JVD present. No tracheal deviation present. No thyromegaly present.   Cardiovascular: Normal rate, regular rhythm, S1 normal and S2 normal. Exam reveals no gallop and no friction rub.   No murmur heard.  Pulmonary/Chest: Effort normal and breath sounds normal. No respiratory distress. She has no wheezes. She has no rales. She exhibits no tenderness.   Abdominal: Soft. She exhibits no distension.   Musculoskeletal: Normal range of motion. She exhibits no edema.   Neurological: She is alert and oriented to person, place, and time. No cranial nerve deficit.   Skin: Skin is warm and dry. She is not diaphoretic.   Psychiatric: She has a normal mood and affect. Her behavior is normal. Judgment normal.        Current Medications:   enoxaparin  40 mg Subcutaneous Daily      sodium chloride 0.9% 75 mL/hr at 03/08/19 2004     ibuprofen, sodium chloride 0.9%    Laboratory (all labs reviewed):  CBC:  Recent Labs   Lab 08/17/18  1005 03/08/19  1001 03/09/19  0203   WHITE BLOOD CELL COUNT 4.72 4.14 5.11   HEMOGLOBIN 11.7 L 10.4 L 9.9 L   HEMATOCRIT 36.5 L 33.2 L 31.8 L   PLATELETS 316 281 263       CHEMISTRIES:  Recent Labs   Lab 08/17/18  1005 03/08/19  1001 03/09/19  0203   GLUCOSE 89 95 137 H   SODIUM 136 137 140   POTASSIUM 4.0 3.6 3.6   BUN BLD 9 8 8   CREATININE 0.8 0.7 0.7   EGFR IF  >60 >60 >60   EGFR IF NON- >60 >60 >60   CALCIUM 9.4 8.7 8.4 L   MAGNESIUM  --  1.8  --        CARDIAC BIOMARKERS:  Recent Labs   Lab 08/17/18  1005 03/08/19  1001 03/08/19  1735 03/08/19  1936 03/09/19  0203   TROPONIN I <0.006 <0.006 <0.006 <0.006 <0.006       COAGS:        LIPIDS/LFTS:  Recent Labs   Lab 08/17/18  1005 03/08/19  1001 03/09/19  0203   CHOLESTEROL  --   --  167   TRIGLYCERIDES  --   --  191 H   HDL  --   --  37 L   LDL CHOLESTEROL  --   --  91.8   NON-HDL CHOLESTEROL  --   --  130   AST 20 19 15   ALT 18 16 15       BNP:  Recent Labs   Lab 03/08/19  1001   BNP <10       TSH:  Recent Labs   Lab 03/09/19  0203   TSH 1.299       Free T4:        Diagnostic Results:  ECG (personally reviewed and interpreted tracing(s)):  3/8/19 1001 SR 70    Chest X-Ray (personally reviewed and interpreted image(s)): 3/8/19 NAD    CTA Chest 3/8/19  No convincing filling defect to indicate a pulmonary embolus.  The right upper lobe pulmonary artery is partially obscured by artifact from the high-density contrast in the SVC.  Soft tissue prominence in the subcarinal region either the esophagus or subcarinal node.    Echo: 3/8/19 (images personally reviewed and interpreted)  · Mildly decreased left ventricular systolic function. The estimated ejection fraction is 45%  · Mild global hypokinetic wall  motion.  · Grade I (mild) left ventricular diastolic dysfunction consistent with impaired relaxation.  · Normal right ventricular systolic function.  · Mild-to-moderate mitral regurgitation.  · Mild to moderate tricuspid regurgitation.  · The estimated PA systolic pressure is 34 mm Hg        Assessment and Plan:     * Syncope    Symptoms seem most consistent with a neurocardiogenic etiology and the pt describes 1 week of nausea leading up to this suggest dehydration as well.  EKG is normal, trop x4 is normal and LV fxn is only mildly diminished.  CTA neg for PE.  I doubt ACS or malignant arrhythmia.  Postural VS normal.  Dispo planning for outpatient cardiac stress testing is appropriate.     Precordial pain    CP with FH premat CAD as only RF.  Trop neg x4.  EKG normal  Echo with EF 45%  Will plan outpatient stress testing  The patient was instructed to return to the hospital for severe unremitting symptoms.         VTE Risk Mitigation (From admission, onward)        Ordered     enoxaparin injection 40 mg  Daily      03/08/19 1746        Cardiology will sign off, pls call with questions.  Pt to follow up with me in 1-2 weeks for outpatient stress testing    Armen Love MD  Cardiology  [facility]

## 2019-03-09 NOTE — NURSING
Patient escorted by transport to family vehicle for discharge home. Patient accompanied by friend and daughter. No apparent distress noted.

## 2019-03-09 NOTE — HOSPITAL COURSE
Interval Hx: no cp, sob, still occ LH, no LOC.  Case d/w D. HELEN Galvan    Tele: NSR, no arrhythmia (personally reviewed and interpreted)

## 2019-03-09 NOTE — ASSESSMENT & PLAN NOTE
CP with FH premat CAD as only RF.  Trop neg x4.  EKG normal  Echo with EF 45%  Will plan outpatient stress testing  The patient was instructed to return to the hospital for severe unremitting symptoms.

## 2019-03-09 NOTE — SUBJECTIVE & OBJECTIVE
Past Medical History:   Diagnosis Date    Anemia     Bronchitis     Syncope 2019       Past Surgical History:   Procedure Laterality Date     SECTION  2008    DEBRIDEMENT OF CARPAL BONE Right 2015    long finger, s/p traumatic injury    DEBRIDEMENT-CARPAL Right 2015    Performed by [facility]olai Ha [facility] at [facility]    MYOMECTOMY  2007    PERCUTANEOUS PINNING OF FINGER Right 2015    long finger, s/p traumatic injury    PINNING-PERCUTANEOUS-FINGER Right 2015    Performed by [facility]ai Ha [facility] at [facility] OR       Review of patient's allergies indicates:  No Known Allergies    No current facility-administered medications on file prior to encounter.      Current Outpatient Medications on File Prior to Encounter   Medication Sig    ibuprofen (ADVIL,MOTRIN) 600 MG tablet Take 1 tablet (600 mg total) by mouth every 6 (six) hours as needed for Pain.     Family History     Problem Relation (Age of Onset)    Breast cancer Maternal Grandmother    Diabetes Maternal Grandmother, Mother, Sister    Hypertension Mother    Stroke Maternal Grandmother        Tobacco Use    Smoking status: Never Smoker    Smokeless tobacco: Never Used   Substance and Sexual Activity    Alcohol use: Yes     Comment: socially    Drug use: No    Sexual activity: Yes     Partners: Male     Birth control/protection: None     Review of Systems   Gastrointestinal: Negative for melena.   Genitourinary: Negative for hematuria.     Objective:     Vital Signs (Most Recent):  Temp: 97.7 °F (36.5 °C) (19)  Pulse: 78 (19)  Resp: 18 (19)  BP: 114/60 (19)  SpO2: 98 % (19) Vital Signs (24h Range):  Temp:  [97.7 °F (36.5 °C)-98.7 °F (37.1 °C)] 97.7 °F (36.5 °C)  Pulse:  [66-87] 78  Resp:  [16-20] 18  SpO2:  [97 %-100 %] 98 %  BP: (110-167)/(58-95) 114/60     Weight: 122.6 kg (270 lb 4.5 oz)  Body mass index is 39.91 kg/m².    SpO2: 98 %  O2 Device (Oxygen  Therapy): room air      Intake/Output Summary (Last 24 hours) at 3/9/2019 0919  Last data filed at 3/9/2019 0700  Gross per 24 hour   Intake 2240 ml   Output --   Net 2240 ml       Lines/Drains/Airways     Peripheral Intravenous Line                 Peripheral IV - Single Lumen 03/08/19 1000 Right Antecubital less than 1 day              Exam unchanged vs 3/8/19  Physical Exam   Constitutional: She is oriented to person, place, and time. She appears well-developed and well-nourished. No distress.   HENT:   Head: Normocephalic and atraumatic.   Mouth/Throat: No oropharyngeal exudate.   Eyes: Conjunctivae and EOM are normal. Pupils are equal, round, and reactive to light. No scleral icterus.   Neck: Normal range of motion. Neck supple. No JVD present. No tracheal deviation present. No thyromegaly present.   Cardiovascular: Normal rate, regular rhythm, S1 normal and S2 normal. Exam reveals no gallop and no friction rub.   No murmur heard.  Pulmonary/Chest: Effort normal and breath sounds normal. No respiratory distress. She has no wheezes. She has no rales. She exhibits no tenderness.   Abdominal: Soft. She exhibits no distension.   Musculoskeletal: Normal range of motion. She exhibits no edema.   Neurological: She is alert and oriented to person, place, and time. No cranial nerve deficit.   Skin: Skin is warm and dry. She is not diaphoretic.   Psychiatric: She has a normal mood and affect. Her behavior is normal. Judgment normal.       Current Medications:   enoxaparin  40 mg Subcutaneous Daily      sodium chloride 0.9% 75 mL/hr at 03/08/19 2004     ibuprofen, sodium chloride 0.9%    Laboratory (all labs reviewed):  CBC:  Recent Labs   Lab 08/17/18  1005 03/08/19  1001 03/09/19  0203   WHITE BLOOD CELL COUNT 4.72 4.14 5.11   HEMOGLOBIN 11.7 L 10.4 L 9.9 L   HEMATOCRIT 36.5 L 33.2 L 31.8 L   PLATELETS 316 281 263       CHEMISTRIES:  Recent Labs   Lab 08/17/18  1005 03/08/19  1001 03/09/19  0203   GLUCOSE 89 95 137 H    SODIUM 136 137 140   POTASSIUM 4.0 3.6 3.6   BUN BLD 9 8 8   CREATININE 0.8 0.7 0.7   EGFR IF  >60 >60 >60   EGFR IF NON- >60 >60 >60   CALCIUM 9.4 8.7 8.4 L   MAGNESIUM  --  1.8  --        CARDIAC BIOMARKERS:  Recent Labs   Lab 08/17/18  1005 03/08/19  1001 03/08/19  1735 03/08/19  1936 03/09/19  0203   TROPONIN I <0.006 <0.006 <0.006 <0.006 <0.006       COAGS:        LIPIDS/LFTS:  Recent Labs   Lab 08/17/18  1005 03/08/19  1001 03/09/19  0203   CHOLESTEROL  --   --  167   TRIGLYCERIDES  --   --  191 H   HDL  --   --  37 L   LDL CHOLESTEROL  --   --  91.8   NON-HDL CHOLESTEROL  --   --  130   AST 20 19 15   ALT 18 16 15       BNP:  Recent Labs   Lab 03/08/19  1001   BNP <10       TSH:  Recent Labs   Lab 03/09/19  0203   TSH 1.299       Free T4:        Diagnostic Results:  ECG (personally reviewed and interpreted tracing(s)):  3/8/19 1001 SR 70    Chest X-Ray (personally reviewed and interpreted image(s)): 3/8/19 NAD    CTA Chest 3/8/19  No convincing filling defect to indicate a pulmonary embolus.  The right upper lobe pulmonary artery is partially obscured by artifact from the high-density contrast in the SVC.  Soft tissue prominence in the subcarinal region either the esophagus or subcarinal node.    Echo: 3/8/19 (images personally reviewed and interpreted)  · Mildly decreased left ventricular systolic function. The estimated ejection fraction is 45%  · Mild global hypokinetic wall motion.  · Grade I (mild) left ventricular diastolic dysfunction consistent with impaired relaxation.  · Normal right ventricular systolic function.  · Mild-to-moderate mitral regurgitation.  · Mild to moderate tricuspid regurgitation.  · The estimated PA systolic pressure is 34 mm Hg

## 2019-03-09 NOTE — NURSING
Discharge instructions given to patient at bedside. Patient verbalized understanding of instructions. Patient states willingness to comply. Saline lock removed. Tele monitoring removed. Pt states her ride will be here between 12:30-1pm

## 2019-03-09 NOTE — DISCHARGE SUMMARY
[facility] Hospital Medicine  Discharge Summary      Patient Name: Fany Fontenot  MRN: [id number]  Admission Date: 3/8/2019  Hospital Length of Stay: 0 days  Discharge Date and Time:  2019 2:05 PM  Attending Physician: Trisha Ro   Discharging Provider: Antonio Galvan PA-C  Primary Care Provider: Randa Starr MD      HPI:   Fany Fontenot 44 y.o. female presents to the hospital with a chief complaint of syncope. She reports today she was walking downstairs when she experienced a syncopal episode. She reports she was dizzy/lightheaded before the syncopal episode. She believes she was down for a few minutes. She reports she landed on her lower back. She has been experiencing intermittent sharp chest pains caused with exertion for the last 6 months. Today she experienced similar chest pain while at rest, that then resolved on its own. She finds for the last 6 months she has had progressively worsening SOB. She now becomes SOB within walking 1 flight of stairs. She denies chest pain or SOB with the syncopal episode today and denies any symptoms now. Her mother and her mother's aunts had heart disease. Her aunts  in their 50's from heart disease. Her mother  in her 60's. She endorses syncope, nausea, dizziness, SOB, chest pain. She denies vomiting, diarrhea, constipation, dysuria, orthopnea, and lower extremity edema.    In the ED, H&H 10.4&33.2, d-dimer 0.74, troponin negative, UA without nitrites or leukocytes, chest x-ray limited due to body habitus, but no consolidations, no PE on CTA.    * No surgery found *      Hospital Course:   Fany Fontenot 44 y.o. female placed in observation for syncope. In the ED, H&H 10.4&33.2, d-dimer 0.74, troponin negative, UA without nitrites or leukocytes, chest x-ray limited due to body habitus, but no consolidations, no PE on CTA. Echo with mildly depressed left ventricular function. Orthostatic negative. Cardiology evaluated patient  and recommended outpatient stress test and follow up in clinic. She had no further episodes of chest pain or SOB. She was cleared for discharge by cardiology with no arrhythmia events overnight on telemetry. The syncope was mostly likely neurocardiogenic in origin, as it was associated with some nausea.  At discharge, patient was stable without complaint. She will [facility] up with [facility] for further cardiology evaluation.      Consults:   Consults (From admission, onward)        Status Ordering Provider     Inpatient consult to Cardiology  Once     Provider:  Armen Cristina MD    Completed SHOWCALLY EMANUEL     Inpatient consult to Social Work  Once     Provider:  (Not yet assigned)    Acknowledged ARMEN CRISTINA          No new Assessment & Plan notes have been filed under this hospital service since the last note was generated.  Service: Hospital Medicine    Final Active Diagnoses:    Diagnosis Date Noted POA    PRINCIPAL PROBLEM:  Syncope [R55] 03/08/2019 Yes    Precordial pain [R07.2] 03/08/2019 Yes      Problems Resolved During this Admission:       Discharged Condition: stable    Disposition: Home or Self Care    Follow Up:  Follow-up Information     [facility] Elijah Mcdonough. Schedule an appointment as soon as possible for a visit in 1 week.    Why:  for Cardiology follow up  Contact information:  [address] [place] [address]  [phone]                 Patient Instructions:      Diet Cardiac     Notify your health care provider if you experience any of the following:  temperature >100.4     Notify your health care provider if you experience any of the following:  persistent nausea and vomiting or diarrhea     Notify your health care provider if you experience any of the following:  persistent dizziness, light-headedness, or visual disturbances     Notify your health care provider if you experience any of the following:  increased confusion or weakness     Activity as tolerated        Significant Diagnostic Studies: Labs:   BMP:   Recent Labs   Lab 03/08/19  1001 03/09/19  0203   GLU 95 137*    140   K 3.6 3.6    109   CO2 26 25   BUN 8 8   CREATININE 0.7 0.7   CALCIUM 8.7 8.4*   MG 1.8  --    , CMP   Recent Labs   Lab 03/08/19  1001 03/09/19  0203    140   K 3.6 3.6    109   CO2 26 25   GLU 95 137*   BUN 8 8   CREATININE 0.7 0.7   CALCIUM 8.7 8.4*   PROT 7.9 7.0   ALBUMIN 3.4* 2.9*   BILITOT 0.3 0.1   ALKPHOS 71 65   AST 19 15   ALT 16 15   ANIONGAP 5* 6*   ESTGFRAFRICA >60 >60   EGFRNONAA >60 >60   , Lipid Panel   Lab Results   Component Value Date    CHOL 167 03/09/2019    HDL 37 (L) 03/09/2019    LDLCALC 91.8 03/09/2019    TRIG 191 (H) 03/09/2019    CHOLHDL 22.2 03/09/2019    and Troponin   Recent Labs   Lab 03/09/19  0203   TROPONINI <0.006       Pending Diagnostic Studies:     None         Medications:  Reconciled Home Medications:      Medication List      CONTINUE taking these medications    ibuprofen 600 MG tablet  Commonly known as:  ADVIL,MOTRIN  Take 1 tablet (600 mg total) by mouth every 6 (six) hours as needed for Pain.            Indwelling Lines/Drains at time of discharge:   Lines/Drains/Airways          None          Time spent on the discharge of patient: 34 minutes  Patient was seen and examined on the date of discharge and determined to be suitable for discharge.         Antonio Galvan PA-C  Department of Hospital Medicine  [facility]

## 2019-03-09 NOTE — NURSING
Arrived back to floor from testing. NAD noted.    Bedside Report given to night nurse. Walking rounds completed. Visualized and assessed patient NAD noted. Safety precautions maintained and call light within reach.    Chart check completed.

## 2019-03-09 NOTE — DISCHARGE INSTRUCTIONS
PLEASE BRING TO ALL FOLLOW UP APPOINTMENTS:   1) A COPY YOUR DISCHARGE INSTRUCTIONS   2) ALL MEDICINES YOU ARE CURRENTLY TAKING IN THEIR ORIGINAL BOTTLES   3) IDENTIFICATION CARD   4) INSURANCE CARD    **PLEASE ARRIVE 30 MINUTES AHEAD OF SCHEDULED APPOINTMENT TIME   ++PLEASE CALL 48 HOURS IN ADVANCE IF YOU MUST RESCHEDULE YOUR APPOINTMENT DAY AND/OR TIME   +++Cost of Visit is $25-45 depending on services provided

## 2019-03-09 NOTE — ASSESSMENT & PLAN NOTE
Symptoms seem most consistent with a neurocardiogenic etiology and the pt describes 1 week of nausea leading up to this suggest dehydration as well.  EKG is normal, trop x4 is normal and LV fxn is only mildly diminished.  CTA neg for PE.  I doubt ACS or malignant arrhythmia.  Postural VS normal.  Dispo planning for outpatient cardiac stress testing is appropriate.

## 2019-03-10 NOTE — PLAN OF CARE
03/09/19 1130   Final Note   Assessment Type Final Discharge Note   Anticipated Discharge Disposition Home   What phone number can be called within the next 1-3 days to see how you are doing after discharge? ([phone])   Hospital Follow Up  Appt(s) scheduled? Yes   Discharge plans and expectations educations in teach back method with documentation complete? Yes   Right Care Referral Info   Post Acute Recommendation No Care

## 2019-03-10 NOTE — PLAN OF CARE
To patient's room to discuss patient managing her care at home.      TN Role Explained.  Patient identified by using 2 identifiers:  Name and date of birth    Patient stated that her daughter WILL HELP AT HOME WITH her RECOVERY.      TN name and contact info placed on the communication board    Preferred Pharmacy:  [id number] - [place] - [address]  [address]  [place] [address]  Phone: [phone] Fax: [phone]       03/09/19 1055   Discharge Assessment   Assessment Type Discharge Planning Assessment   Confirmed/corrected address and phone number on facesheet? Yes   Assessment information obtained from? Patient   Expected Length of Stay (days) (discharging)   Communicated expected length of stay with patient/caregiver yes   Prior to hospitilization cognitive status: Alert/Oriented   Prior to hospitalization functional status: Independent   Current cognitive status: Alert/Oriented   Current Functional Status: Independent   Lives With child(angelina), dependent   Able to Return to Prior Arrangements yes   Is patient able to care for self after discharge? Yes   Patient's perception of discharge disposition home or selfcare   Readmission Within the Last 30 Days no previous admission in last 30 days   Patient currently being followed by outpatient case management? No   Patient currently receives any other outside agency services? No   Equipment Currently Used at Home none   Do you have any problems affording any of your prescribed medications? No   Is the patient taking medications as prescribed? yes   Does the patient have transportation home? Yes   Transportation Anticipated family or friend will provide   Does the patient receive services at the Coumadin Clinic? No   Discharge Plan A Home with family   DME Needed Upon Discharge  none   Patient/Family in Agreement with Plan yes

## 2019-04-02 ENCOUNTER — RESEARCH ENCOUNTER (OUTPATIENT)
Dept: RESEARCH | Facility: HOSPITAL | Age: 44
End: 2019-04-02

## 2019-04-02 NOTE — PROGRESS NOTES
Date: 02 April 2019  Time: 1612  Subject Initials: AJ   IRB#: [id number]     Study: Inherited Muskuloskeletal Disorders     PI: Angel Jimenez MD      I consented this patient on 02 Apr 2019. The following was discussed:     · Met with participant to discuss possible participation in a research study  · Participant was given a copy of the Informed Consent Form for review  · Participant read the Informed Consent Form in full   · All risks, benefits, alternative therapies, confidentiality, and study requirements were discussed  · Privacy issues and withdrawal options, including HIPAA, were discussed  · Ample opportunity was provided for participant to ask questions and to consider participation  · Participant verbalized that all questions were satisfactorily answered and that they understood the protocol and its requirements  · Participant was provided with contact information for the investigator, physician & research coordinator for future questions or concerns  · Participant denied involvement in any other research study  · Informed consent was signed by the patient; she was provided with a copy of the signed consent form for her records.      Consent was obtained prior to conducting any study-related procedures. Afterwards, saliva sample was obtained. Family history was acquired from [facility].

## 2019-05-01 ENCOUNTER — HOSPITAL ENCOUNTER (EMERGENCY)
Facility: HOSPITAL | Age: 44
Discharge: HOME | End: 2019-05-01
Attending: EMERGENCY MEDICINE
Payer: MEDICAID

## 2019-05-01 VITALS
SYSTOLIC BLOOD PRESSURE: 118 MMHG | HEART RATE: 66 BPM | RESPIRATION RATE: 18 BRPM | HEIGHT: 69 IN | OXYGEN SATURATION: 99 % | WEIGHT: 245 LBS | TEMPERATURE: 99 F | DIASTOLIC BLOOD PRESSURE: 74 MMHG | BODY MASS INDEX: 36.29 KG/M2

## 2019-05-01 DIAGNOSIS — R29.818 TRANSIENT NEUROLOGICAL SYMPTOMS: ICD-10-CM

## 2019-05-01 DIAGNOSIS — R20.0 NUMBNESS: Primary | ICD-10-CM

## 2019-05-01 LAB
ALBUMIN SERPL BCP-MCNC: 3.6 G/DL (ref 3.5–5.2)
ALP SERPL-CCNC: 69 U/L (ref 55–135)
ALT SERPL W/O P-5'-P-CCNC: 13 U/L (ref 10–44)
ANION GAP SERPL CALC-SCNC: 7 MMOL/L (ref 8–16)
AST SERPL-CCNC: 21 U/L (ref 10–40)
BASOPHILS # BLD AUTO: 0.03 K/UL (ref 0–0.2)
BASOPHILS NFR BLD: 0.5 % (ref 0–1.9)
BILIRUB SERPL-MCNC: 0.2 MG/DL (ref 0.1–1)
BUN SERPL-MCNC: 7 MG/DL (ref 6–20)
CALCIUM SERPL-MCNC: 9.2 MG/DL (ref 8.7–10.5)
CHLORIDE SERPL-SCNC: 106 MMOL/L (ref 95–110)
CHOLEST SERPL-MCNC: 198 MG/DL (ref 120–199)
CHOLEST/HDLC SERPL: 4 {RATIO} (ref 2–5)
CO2 SERPL-SCNC: 25 MMOL/L (ref 23–29)
CREAT SERPL-MCNC: 0.7 MG/DL (ref 0.5–1.4)
DIFFERENTIAL METHOD: ABNORMAL
EOSINOPHIL # BLD AUTO: 0.1 K/UL (ref 0–0.5)
EOSINOPHIL NFR BLD: 1.8 % (ref 0–8)
ERYTHROCYTE [DISTWIDTH] IN BLOOD BY AUTOMATED COUNT: 17.8 % (ref 11.5–14.5)
EST. GFR  (AFRICAN AMERICAN): >60 ML/MIN/1.73 M^2
EST. GFR  (NON AFRICAN AMERICAN): >60 ML/MIN/1.73 M^2
GLUCOSE SERPL-MCNC: 91 MG/DL (ref 70–110)
HCT VFR BLD AUTO: 34.9 % (ref 37–48.5)
HDLC SERPL-MCNC: 50 MG/DL (ref 40–75)
HDLC SERPL: 25.3 % (ref 20–50)
HGB BLD-MCNC: 10.7 G/DL (ref 12–16)
INR PPP: 1 (ref 0.8–1.2)
LDLC SERPL CALC-MCNC: 125.8 MG/DL (ref 63–159)
LYMPHOCYTES # BLD AUTO: 3.1 K/UL (ref 1–4.8)
LYMPHOCYTES NFR BLD: 55.3 % (ref 18–48)
MCH RBC QN AUTO: 23.8 PG (ref 27–31)
MCHC RBC AUTO-ENTMCNC: 30.7 G/DL (ref 32–36)
MCV RBC AUTO: 78 FL (ref 82–98)
MONOCYTES # BLD AUTO: 0.4 K/UL (ref 0.3–1)
MONOCYTES NFR BLD: 7.4 % (ref 4–15)
NEUTROPHILS # BLD AUTO: 2 K/UL (ref 1.8–7.7)
NEUTROPHILS NFR BLD: 34.8 % (ref 38–73)
NONHDLC SERPL-MCNC: 148 MG/DL
PLATELET # BLD AUTO: 295 K/UL (ref 150–350)
PMV BLD AUTO: 10 FL (ref 9.2–12.9)
POTASSIUM SERPL-SCNC: 3.6 MMOL/L (ref 3.5–5.1)
PROT SERPL-MCNC: 8.7 G/DL (ref 6–8.4)
PROTHROMBIN TIME: 10.9 SEC (ref 9–12.5)
RBC # BLD AUTO: 4.5 M/UL (ref 4–5.4)
SODIUM SERPL-SCNC: 138 MMOL/L (ref 136–145)
TRIGL SERPL-MCNC: 111 MG/DL (ref 30–150)
TSH SERPL DL<=0.005 MIU/L-ACNC: 1.67 UIU/ML (ref 0.4–4)
WBC # BLD AUTO: 5.68 K/UL (ref 3.9–12.7)

## 2019-05-01 PROCEDURE — 80053 COMPREHEN METABOLIC PANEL: CPT

## 2019-05-01 PROCEDURE — 99499 NO LOS: ICD-10-PCS | Mod: GT,,, | Performed by: PSYCHIATRY & NEUROLOGY

## 2019-05-01 PROCEDURE — 85025 COMPLETE CBC W/AUTO DIFF WBC: CPT

## 2019-05-01 PROCEDURE — 93005 ELECTROCARDIOGRAM TRACING: CPT

## 2019-05-01 PROCEDURE — 80061 LIPID PANEL: CPT

## 2019-05-01 PROCEDURE — 99285 EMERGENCY DEPT VISIT HI MDM: CPT | Mod: 25

## 2019-05-01 PROCEDURE — 82962 GLUCOSE BLOOD TEST: CPT

## 2019-05-01 PROCEDURE — 99499 UNLISTED E&M SERVICE: CPT | Mod: GT,,, | Performed by: PSYCHIATRY & NEUROLOGY

## 2019-05-01 PROCEDURE — 93010 EKG 12-LEAD: ICD-10-PCS | Mod: ,,, | Performed by: INTERNAL MEDICINE

## 2019-05-01 PROCEDURE — 93010 ELECTROCARDIOGRAM REPORT: CPT | Mod: ,,, | Performed by: INTERNAL MEDICINE

## 2019-05-01 PROCEDURE — 85610 PROTHROMBIN TIME: CPT

## 2019-05-01 PROCEDURE — 84443 ASSAY THYROID STIM HORMONE: CPT

## 2019-05-01 RX ORDER — MECLIZINE HCL 12.5 MG 12.5 MG/1
12.5 TABLET ORAL 3 TIMES DAILY PRN
Qty: 20 TABLET | Refills: 0 | Status: SHIPPED | OUTPATIENT
Start: 2019-05-01 | End: 2019-06-16

## 2019-05-01 RX ORDER — ASPIRIN 81 MG/1
81 TABLET ORAL DAILY
Qty: 30 TABLET | Refills: 0 | Status: SHIPPED | OUTPATIENT
Start: 2019-05-01 | End: 2019-06-16

## 2019-05-01 NOTE — ED NOTES
Neurologist spoke with Dr. Jones after 3 failed attempts with Tele computer (3 missed calls, but computer did not ring).  MRI will be ordered per MD.

## 2019-05-01 NOTE — CONSULTS
Attempted to contact spoke through [employer] 3 times at 6:10 pm when called by [facility] without answer.  Reviewed CT at 6:11 - normal.    By documentation by ED provider patient outside of treatment window since no signs of LVO.  Discussed w him over the phone, recommend to admit for workup.    Loki Woo MD  Vascular and Interventional Neurology Staff  Director of [facility]  [facility]  [phone]

## 2019-05-01 NOTE — ED PROVIDER NOTES
Encounter Date: 2019    This is a SORT/MSE of a 44 y.o. female presenting to the ED with c/o right sided facial numbness with sensation change to touch that began at appx 12:30p. No facial droop noted on exam in triage. Equal upper extremity strength. Reports different sensation in R arm compared to L arm. CT orders placed. ED Attending notified. Care will be transferred to an alternate provider when patient is roomed for a full evaluation and final disposition. MOSHE Marrero, FNP-C 2019 4:41 PM    SCRIBE #1 NOTE: I, [place] See, am scribing for, and in the presence of,  George Jones MD. I have scribed the following portions of the note - Other sections scribed: HPI, ROS, PE.       History     Chief Complaint   Patient presents with    Numbness     reports having nubness and tingling that started 3 and half hours ago to face, and lightheadedness reports blurry vision      CC: Numbness    HPI: This is a 44 y.o. F who has Anemia, and Bronchitis who presents to the ED for emergent evaluation of acute numbness and tingling on the right side of face with associated lightheadedness that began while at work at 12:00pm today. Pt reports a transient episode of pain on the right side of the head and diaphoresis during onset of numbness that spontaneously resolved. Pt reports a room spinning sensation and feeling off balance. There are no exacerbating factors. She does not have a Hx of similar problem. Pt states that her grandmother had a stroke in 80's. Pt denies CP, headache, nausea, vomiting, SOB, diaphoresis, dysuria, ear pain, fever, sore throat, syncope, or nasal congestion.    The history is provided by the patient. No  was used.     Review of patient's allergies indicates:  No Known Allergies  Past Medical History:   Diagnosis Date    Anemia     Bronchitis     Syncope 2019     Past Surgical History:   Procedure Laterality Date     SECTION  2008     DEBRIDEMENT OF CARPAL BONE Right 04/01/2015    long finger, s/p traumatic injury    DEBRIDEMENT-CARPAL Right 4/1/2015    Performed by [facility] at [facility]    MYOMECTOMY  2007    PERCUTANEOUS PINNING OF FINGER Right 04/01/2015    long finger, s/p traumatic injury    PINNING-PERCUTANEOUS-FINGER Right 4/1/2015    Performed by [facility] at [facility] OR     Family History   Problem Relation Age of Onset    Stroke Maternal Grandmother     Diabetes Maternal Grandmother     Breast cancer Maternal Grandmother     Diabetes Mother     Hypertension Mother     Diabetes Sister     Colon cancer Neg Hx     Ovarian cancer Neg Hx      Social History     Tobacco Use    Smoking status: Never Smoker    Smokeless tobacco: Never Used   Substance Use Topics    Alcohol use: Yes     Comment: socially    Drug use: No     Review of Systems   Constitutional: Negative for chills, diaphoresis and fever.   HENT: Negative for congestion, ear pain and sore throat.    Eyes: Negative for pain.   Respiratory: Negative for cough and shortness of breath.    Cardiovascular: Negative for chest pain.   Gastrointestinal: Negative for abdominal pain, diarrhea, nausea and vomiting.   Genitourinary: Negative for dysuria.   Musculoskeletal: Negative for back pain.   Skin: Negative for rash.   Neurological: Positive for dizziness, light-headedness and numbness. Negative for syncope and headaches.        (+) Tingling       Physical Exam     Initial Vitals [05/01/19 1640]   BP Pulse Resp Temp SpO2   137/71 75 18 98.5 °F (36.9 °C) 99 %      MAP       --         Physical Exam    Nursing note and vitals reviewed.  Constitutional: She is not diaphoretic. No distress.   HENT:   Head: Normocephalic and atraumatic.   Right Ear: Tympanic membrane normal.   Left Ear: Tympanic membrane normal.   Mouth/Throat: Oropharynx is clear and moist.   Eyes: EOM are normal. Pupils are equal, round, and reactive to light. No scleral icterus.    (+) Nystagmus with right sided gaze   Neck: Normal range of motion. Neck supple. No JVD present.   Cardiovascular: Normal rate, regular rhythm and intact distal pulses.   Pulmonary/Chest: Breath sounds normal. No stridor. No respiratory distress.   Abdominal: Soft. Bowel sounds are normal. She exhibits no distension. There is no tenderness.   Musculoskeletal: Normal range of motion. She exhibits no edema or tenderness.   Neurological: She is alert and oriented to person, place, and time. She has normal strength. No cranial nerve deficit.   Subjective decreased sensation on her right face, right upper extremity, right lower extremity.    Sensation intact to soft and sharp sensation throughout.    Normal finger to nose  Normal rapid alternating movements   Skin: Skin is warm and dry.   Psychiatric: She has a normal mood and affect.         ED Course   Procedures  Labs Reviewed   CBC W/ AUTO DIFFERENTIAL - Abnormal; Notable for the following components:       Result Value    Hemoglobin 10.7 (*)     Hematocrit 34.9 (*)     Mean Corpuscular Volume 78 (*)     Mean Corpuscular Hemoglobin 23.8 (*)     Mean Corpuscular Hemoglobin Conc 30.7 (*)     RDW 17.8 (*)     Gran% 34.8 (*)     Lymph% 55.3 (*)     All other components within normal limits   COMPREHENSIVE METABOLIC PANEL - Abnormal; Notable for the following components:    Total Protein 8.7 (*)     Anion Gap 7 (*)     All other components within normal limits   PROTIME-INR   TSH   LIPID PANEL   POCT GLUCOSE   POCT GLUCOSE     EKG Readings: (Independently Interpreted)   Initial Reading: No STEMI. Rhythm: Normal Sinus Rhythm. Heart Rate: 66. Ectopy: No Ectopy. Conduction: Normal. ST Segments: Normal ST Segments. T Waves: Normal. Axis: Normal.     ECG Results          ECG 12 lead (Final result)  Result time 05/02/19 20:47:43    Final result by Interface, Lab In [facility] (05/02/19 20:47:43)                 Narrative:    Test Reason : R20.0,    Vent. Rate : 066 BPM      Atrial Rate : 066 BPM     P-R Int : 172 ms          QRS Dur : 088 ms      QT Int : 436 ms       P-R-T Axes : 042 085 044 degrees     QTc Int : 457 ms    Normal sinus rhythm  Normal ECG  When compared with ECG of 08-MAR-2019 10:01,  No significant change was found    Confirmed by Armen Love MD ([id number]) on 5/2/2019 8:47:39 PM    Referred By: DIANE VIZCARRA           Confirmed By:Armen Love MD                            Imaging Results          MRI Brain Without Contrast (Final result)  Result time 05/01/19 20:27:01    Final result by Dhiraj Colindres MD (05/01/19 20:27:01)                 Impression:      Unremarkable noncontrast MRI of the brain.      Electronically signed by: Dhiraj Colindres MD  Date:    05/01/2019  Time:    20:27             Narrative:    EXAMINATION:  MRI BRAIN WITHOUT CONTRAST    CLINICAL HISTORY:  RUE RLE numbness, dizziness, r/o CVA;    TECHNIQUE:  Multiplanar multisequence MR imaging of the brain was performed without contrast.    COMPARISON:  CT scan of the head dated 05/01/2019.    FINDINGS:  The craniocervical junction is unremarkable.  The midline structures are within normal limits.  The intracranial flow voids are within normal limits.    No diffusion-weighted signal abnormality is present.  There is no focal parenchymal signal abnormality.  The ventricles and sulci are within normal limits.  There are no extra-axial fluid collections.  There is no evidence of intracranial hemorrhage.  There is no evidence of mass effect.    The orbits and intraorbital contents are within normal limits.  There is a retention cyst within the right maxillary sinus.  The remainder of the paranasal sinuses and mastoid air cells are clear.                               CT Head Without Contrast (Final result)  Result time 05/01/19 16:51:23    Final result by eSan Goff MD (05/01/19 16:51:23)                 Impression:      1. No acute intracranial abnormalities.      Electronically signed by: Sean  MD Shell  Date:    05/01/2019  Time:    16:51             Narrative:    EXAMINATION:  CT HEAD WITHOUT CONTRAST    CLINICAL HISTORY:  Sensation loss;    TECHNIQUE:  Low dose axial images were obtained through the head.  Coronal and sagittal reformations were also performed. Contrast was not administered.    COMPARISON:  03/08/2019    FINDINGS:  There is no evidence of acute major vascular territory infarct, hemorrhage, or mass.  There is no hydrocephalus.  There are no abnormal extra-axial fluid collections.  The paranasal sinuses and mastoid air cells are clear, and there is no evidence of calvarial fracture.  The visualized soft tissues are unremarkable.                              X-Rays:   Independently Interpreted Readings:   Head CT: No skull fracture.  No acute stroke.  No hemorrhage.     Medical Decision Making:   History:   Old Medical Records: I decided to obtain old medical records.  Old Records Summarized: records from previous admission(s).       <> Summary of Records: Recent admission for syncope  Differential Diagnosis:   CVA  TIA  Complex migraine  Vertigo  Electrolyte abnormality  Independently Interpreted Test(s):   I have ordered and independently interpreted X-rays - see prior notes.  I have ordered and independently interpreted EKG Reading(s) - see prior notes  Clinical Tests:   Lab Tests: Ordered and Reviewed  Radiological Study: Ordered and Reviewed  Medical Tests: Ordered and Reviewed  ED Management:  Patient is afebrile and in no acute distress at time history and physical.  EKG is without definite acute ischemic changes.  She has subjective decreased sensation to the right side of the face, right upper extremity, right lower extremity however she has intact sensation to sharp and light touch all areas.  CT scan the brain is without evidence of acute CVA.  Case discussed with Neurology tele stroke however patient was not examined via tele stroke.  Per Neurology tele Stroke patient does  not have symptoms indicative large vessel occlusion, however should have further workup to rule out CVA.  Patient is sent for MRI of the brain to further evaluate for CVA.  MRI of the brain is unremarkable. I have low clinical suspicion of CVA as a cause of patient's symptoms. Patient has remained hemodynamically stable emergency department, and has had no further neurological symptoms. Patient is stable for discharge on daily aspirin prophylactically to follow with primary physician, Neurology for further outpatient workup of her symptoms. Patient and family member counseled extensively on concerning symptoms worse return to the emergency department included vision changes, numbness, weakness, difficulty speaking, difficulty walking, chest pain, breathing difficulty or any new, worsening or concerning symptoms. Patient and family member counseled on the importance of close follow-up.  Understanding and agreement with treatment plan was expressed  This chart was completed using dictation software, as a result there may be some transcription errors.             Scribe Attestation:   Scribe #1: I performed the above scribed service and the documentation accurately describes the services I performed. I attest to the accuracy of the note.    Attending Attestation:           Physician Attestation for Scribe:  Physician Attestation Statement for Scribe #1: I, George Jones MD, reviewed documentation, as scribed by [place] See in my presence, and it is both accurate and complete.                    Clinical Impression:       ICD-10-CM ICD-9-CM   1. Numbness R20.0 782.0   2. Transient neurological symptoms R29.818 781.99         Disposition:   Disposition: Discharged  Condition: Stable                        George Jones MD  05/07/19 [id number]

## 2019-05-02 LAB — POCT GLUCOSE: 91 MG/DL (ref 70–110)

## 2019-05-02 NOTE — DISCHARGE INSTRUCTIONS
The exact cause of her symptoms is unclear at this time.  CT scan MRI did not show evidence of a stroke as the cause of your symptoms. Is important that she make an appointment to see your primary physician as well as a neurologist as soon as possible to further evaluate your symptoms. Use the prescribed meclizine as needed for dizziness.  Do not drive or operate heavy machinery while taking meclizine until you know how it affects you, it may make you drowsy.  Take an aspirin daily as a protective measure in case your symptoms are related to decreased blood flow in your brain.  Return to the emergency department for vision changes, new or worsening numbness or weakness, difficulty speaking, difficulty walking, chest pain, shortness of breath, or any new, worsening or concerning symptoms.

## 2019-05-02 NOTE — ED TRIAGE NOTES
Patient presented to the ED stating that around 1230 today she started having numbing on the right side of her body, blurred vision, dizziness and tingling in her fingers. Patient stated having a HA but denies nausea or vomiting. Patient stated seeing spots. Patient denies taking any meds and has no pertinent medical history.

## 2019-05-05 LAB — POCT GLUCOSE: 86 MG/DL (ref 70–110)

## 2019-06-16 ENCOUNTER — HOSPITAL ENCOUNTER (EMERGENCY)
Facility: HOSPITAL | Age: 44
Discharge: HOME | End: 2019-06-16
Attending: EMERGENCY MEDICINE
Payer: MEDICAID

## 2019-06-16 VITALS
HEIGHT: 69 IN | WEIGHT: 225 LBS | SYSTOLIC BLOOD PRESSURE: 128 MMHG | RESPIRATION RATE: 16 BRPM | BODY MASS INDEX: 33.33 KG/M2 | DIASTOLIC BLOOD PRESSURE: 62 MMHG | HEART RATE: 63 BPM | OXYGEN SATURATION: 99 % | TEMPERATURE: 98 F

## 2019-06-16 DIAGNOSIS — N30.00 ACUTE CYSTITIS WITHOUT HEMATURIA: Primary | ICD-10-CM

## 2019-06-16 DIAGNOSIS — N89.8 VAGINAL DISCHARGE: ICD-10-CM

## 2019-06-16 LAB
B-HCG UR QL: NEGATIVE
BACTERIA #/AREA URNS HPF: ABNORMAL /HPF
BACTERIA GENITAL QL WET PREP: ABNORMAL
BILIRUB UR QL STRIP: NEGATIVE
CLARITY UR: ABNORMAL
CLUE CELLS VAG QL WET PREP: ABNORMAL
COLOR UR: YELLOW
CTP QC/QA: YES
FILAMENT FUNGI VAG WET PREP-#/AREA: ABNORMAL
GLUCOSE UR QL STRIP: NEGATIVE
HGB UR QL STRIP: ABNORMAL
HYALINE CASTS #/AREA URNS LPF: 0 /LPF
KETONES UR QL STRIP: NEGATIVE
LEUKOCYTE ESTERASE UR QL STRIP: ABNORMAL
MICROSCOPIC COMMENT: ABNORMAL
NITRITE UR QL STRIP: NEGATIVE
PH UR STRIP: 5 [PH] (ref 5–8)
PROT UR QL STRIP: ABNORMAL
RBC #/AREA URNS HPF: 4 /HPF (ref 0–4)
SP GR UR STRIP: 1.02 (ref 1–1.03)
SPECIMEN SOURCE: ABNORMAL
SQUAMOUS #/AREA URNS HPF: 5 /HPF
T VAGINALIS GENITAL QL WET PREP: ABNORMAL
URN SPEC COLLECT METH UR: ABNORMAL
UROBILINOGEN UR STRIP-ACNC: NEGATIVE EU/DL
WBC #/AREA URNS HPF: >100 /HPF (ref 0–5)
WBC #/AREA VAG WET PREP: ABNORMAL
YEAST GENITAL QL WET PREP: ABNORMAL

## 2019-06-16 PROCEDURE — 25000003 PHARM REV CODE 250: Performed by: PHYSICIAN ASSISTANT

## 2019-06-16 PROCEDURE — 81000 URINALYSIS NONAUTO W/SCOPE: CPT

## 2019-06-16 PROCEDURE — 63600175 PHARM REV CODE 636 W HCPCS: Performed by: PHYSICIAN ASSISTANT

## 2019-06-16 PROCEDURE — 99284 EMERGENCY DEPT VISIT MOD MDM: CPT | Mod: 25

## 2019-06-16 PROCEDURE — 87086 URINE CULTURE/COLONY COUNT: CPT

## 2019-06-16 PROCEDURE — 96372 THER/PROPH/DIAG INJ SC/IM: CPT

## 2019-06-16 PROCEDURE — 81025 URINE PREGNANCY TEST: CPT | Performed by: PHYSICIAN ASSISTANT

## 2019-06-16 PROCEDURE — 87210 SMEAR WET MOUNT SALINE/INK: CPT

## 2019-06-16 PROCEDURE — 87491 CHLMYD TRACH DNA AMP PROBE: CPT

## 2019-06-16 RX ORDER — AZITHROMYCIN 250 MG/1
1000 TABLET, FILM COATED ORAL
Status: COMPLETED | OUTPATIENT
Start: 2019-06-16 | End: 2019-06-16

## 2019-06-16 RX ORDER — CEFTRIAXONE 500 MG/1
250 INJECTION, POWDER, FOR SOLUTION INTRAMUSCULAR; INTRAVENOUS
Status: COMPLETED | OUTPATIENT
Start: 2019-06-16 | End: 2019-06-16

## 2019-06-16 RX ORDER — LIDOCAINE HYDROCHLORIDE 10 MG/ML
1 INJECTION INFILTRATION; PERINEURAL
Status: DISCONTINUED | OUTPATIENT
Start: 2019-06-16 | End: 2019-06-16 | Stop reason: HOSPADM

## 2019-06-16 RX ORDER — CEPHALEXIN 500 MG/1
500 CAPSULE ORAL 2 TIMES DAILY
Qty: 14 CAPSULE | Refills: 0 | Status: SHIPPED | OUTPATIENT
Start: 2019-06-16 | End: 2019-06-23

## 2019-06-16 RX ORDER — PHENAZOPYRIDINE HYDROCHLORIDE 200 MG/1
200 TABLET, FILM COATED ORAL 3 TIMES DAILY PRN
Qty: 6 TABLET | Refills: 0 | Status: SHIPPED | OUTPATIENT
Start: 2019-06-16 | End: 2019-06-18

## 2019-06-16 RX ADMIN — CEFTRIAXONE SODIUM 250 MG: 500 INJECTION, POWDER, FOR SOLUTION INTRAMUSCULAR; INTRAVENOUS at 10:06

## 2019-06-16 RX ADMIN — AZITHROMYCIN MONOHYDRATE 1000 MG: 250 TABLET ORAL at 10:06

## 2019-06-16 NOTE — ED PROVIDER NOTES
"Encounter Date: 2019    SCRIBE #1 NOTE: I, Rosa Isela Loretta , am scribing for, and in the presence of,  Trisha Licona. I have scribed the following portions of the note - Other sections scribed: HPI, ROS.       History     Chief Complaint   Patient presents with    Dysuria     Arrives to ER complaining of dysuria and left side flank pain, states "I think its a uti." Denies n/v/d reports some discharge, rates pain 10/10     CC: back pain     HPI: This is a 44 y.o. female who presents to the Emergency Department with a cc of back pain x1 week. Associated symptoms are yellow vaginal discharge and itching/irritation when urinating. Pt denies any nausea, vomiting, fever, chills, or diarrhea. She states that she has had unprotected sex recently with one partner and that there is a chance that she could be pregnant. LMP was 2 months ago.             The history is provided by the patient.     Review of patient's allergies indicates:  No Known Allergies  Past Medical History:   Diagnosis Date    Anemia     Bronchitis     Syncope 2019     Past Surgical History:   Procedure Laterality Date     SECTION  2008    DEBRIDEMENT OF CARPAL BONE Right 2015    long finger, s/p traumatic injury    DEBRIDEMENT-CARPAL Right 2015    [facility]    MYOMECTOMY  2007    PERCUTANEOUS PINNING OF FINGER Right 2015    long finger, s/p traumatic injury    PINNING-PERCUTANEOUS-FINGER Right 2015    [facility]formed by [facility] at [facility] OR     Family History   Problem Relation Age of Onset    Stroke Maternal Grandmother     Diabetes Maternal Grandmother     Breast cancer Maternal Grandmother     Diabetes Mother     Hypertension Mother     Diabetes Sister     Colon cancer Neg Hx     Ovarian cancer Neg Hx      Social History     Tobacco Use    Smoking status: Never Smoker    Smokeless tobacco: Never Used   Substance Use Topics    Alcohol use: " Yes     Comment: socially    Drug use: No     Review of Systems   Constitutional: Negative for chills and fever.   HENT: Negative for congestion and sore throat.    Eyes: Negative for pain.   Respiratory: Negative for shortness of breath.    Cardiovascular: Negative for chest pain.   Gastrointestinal: Negative for abdominal pain, constipation, diarrhea, nausea and vomiting.   Genitourinary: Positive for vaginal discharge. Negative for decreased urine volume, dysuria, flank pain, pelvic pain, vaginal bleeding and vaginal pain.        (+) for itching/irritation when urinating    Musculoskeletal: Positive for back pain. Negative for neck pain.   Skin: Negative for rash.   Neurological: Negative for weakness and headaches.   Hematological: Does not bruise/bleed easily.   Psychiatric/Behavioral: Negative for confusion.   All other systems reviewed and are negative.      Physical Exam     Initial Vitals [06/16/19 0835]   BP Pulse Resp Temp SpO2   126/62 82 16 98.1 °F (36.7 °C) 100 %      MAP       --         Physical Exam    Nursing note and vitals reviewed.  Constitutional: Vital signs are normal. She appears well-developed and well-nourished. She is not diaphoretic. She is cooperative.  Non-toxic appearance. She does not have a sickly appearance. She does not appear ill. No distress.   HENT:   Head: Normocephalic and atraumatic.   Right Ear: External ear normal.   Left Ear: External ear normal.   Nose: Nose normal.   Mouth/Throat: Uvula is midline, oropharynx is clear and moist and mucous membranes are normal. No oropharyngeal exudate.   Eyes: Conjunctivae, EOM and lids are normal. Pupils are equal, round, and reactive to light.   Neck: Trachea normal, normal range of motion, full passive range of motion without pain and phonation normal. Neck supple.   Cardiovascular: Normal rate, regular rhythm, normal heart sounds and intact distal pulses. Exam reveals no gallop and no friction rub.    No murmur  heard.  Pulmonary/Chest: Effort normal and breath sounds normal. No respiratory distress. She has no decreased breath sounds. She has no wheezes. She has no rhonchi. She has no rales.   Abdominal: Soft. Normal appearance and bowel sounds are normal. She exhibits no distension and no mass. There is no tenderness. There is no rigidity, no rebound, no guarding and no CVA tenderness.   Genitourinary: Pelvic exam was performed with patient supine. There is no rash, tenderness, lesion or injury on the right labia. There is no rash, tenderness, lesion or injury on the left labia. Cervix exhibits friability. Cervix exhibits no motion tenderness and no discharge. Right adnexum displays no tenderness. Left adnexum displays no tenderness. No erythema, tenderness or bleeding in the vagina. No foreign body in the vagina. No signs of injury around the vagina. Vaginal discharge (moderate amount of white discharge with green hue) found.   Genitourinary Comments:  exam performed with Nadira Burkett LPN at bedside.   Musculoskeletal: Normal range of motion.   Neurological: She is alert and oriented to person, place, and time.   Skin: Skin is warm and dry. Capillary refill takes less than 2 seconds. No rash noted.   Psychiatric: She has a normal mood and affect. Her speech is normal and behavior is normal. Judgment and thought content normal. Cognition and memory are normal.         ED Course   Procedures  Labs Reviewed   URINALYSIS, REFLEX TO URINE CULTURE - Abnormal; Notable for the following components:       Result Value    Appearance, UA Cloudy (*)     Protein, UA 1+ (*)     Occult Blood UA 2+ (*)     Leukocytes, UA 3+ (*)     All other components within normal limits    Narrative:     Preferred Collection Type->Urine, Clean Catch   VAGINAL SCREEN - Abnormal; Notable for the following components:    WBC - Vaginal Screen Moderate (*)     Bacteria - Vaginal Screen Rare (*)     All other components within normal limits   URINALYSIS  MICROSCOPIC - Abnormal; Notable for the following components:    WBC, UA >100 (*)     All other components within normal limits    Narrative:     Preferred Collection Type->Urine, Clean Catch   C. TRACHOMATIS/N. GONORRHOEAE BY AMP DNA   CULTURE, URINE   POCT URINE PREGNANCY          Imaging Results    None                APC / Resident Notes:   44-year-old female with no past medical history presents for consideration of dysuria.  Patient reports dysuria which began several days ago with associated left sided flank pain and vaginal discharge. She denies further symptoms. She reports unprotected sex with 1 partner.    Exam findings: Patient is non-toxic, afebrile and well appearing.   exam is revealing for moderate amount of white vaginal discharge with a green hue.  There is cervical friability without cervical motion tenderness or cervical discharge. No adnexal tenderness. No CVA tenderness.    If available, past records have been reviewed.  Vitals are reassuring.  Results:   UPT negative  Urinalysis revealing for infection. Culture pending.  Vaginal screen unrevealing for Trichomonas, clue cells or budding yeast.  GC culture pending    My overall impression:  Vaginal discharge, acute cystitis without hematuria  DDx:  Dysuria, acute cystitis, vaginal discharge, vaginitis, STI, other  Considered but Low suspicion for pyelonephritis, obstructive uropathy or PID.    ED course: Prophylactic tx for GC administered in the ED; Azithromycin 1 g and Ceftriaxone.  I will treat acute cystitis with Keflex and Pyridium. I will recommend avoidance of sexual contact until receipt of GC culture results and patient verbalized understanding. I will recommend follow-up with primary care and OBGYN.  I feel this patient is stable for discharge.  ED return precautions given.    The diagnosis and treatment plan have been discussed with the patient. All questions and concerns have been addressed. Patient expressed understanding. An  educational information sheet was given to the patient prior to discharge.     Trisha Licona PA-C                   Clinical Impression:     1. Acute cystitis without hematuria    2. Vaginal discharge          Disposition:   Disposition: Discharged  Condition: Stable   Scribe attestation: I, Trisha Licona, personally performed the services described in this documentation. All medical record entries made by the scribe were at my direction and in my presence.  I have reviewed the chart and agree that the record reflects my personal performance and is accurate and complete                         Trisha Licona PA-C  06/16/19 [id number]

## 2019-06-16 NOTE — DISCHARGE INSTRUCTIONS
Take antibiotics as directed.  Take Pyridium as directed.    You were treated for common vaginal infections today as we discussed.  Follow-up with OBGYN regularly.    Return to the emergency department for any concerns.

## 2019-06-17 LAB
C TRACH DNA SPEC QL NAA+PROBE: NOT DETECTED
N GONORRHOEA DNA SPEC QL NAA+PROBE: NOT DETECTED

## 2019-06-18 LAB — BACTERIA UR CULT: NORMAL

## 2019-09-10 ENCOUNTER — HOSPITAL ENCOUNTER (EMERGENCY)
Facility: HOSPITAL | Age: 44
Discharge: HOME | End: 2019-09-10
Attending: EMERGENCY MEDICINE
Payer: MEDICAID

## 2019-09-10 VITALS
BODY MASS INDEX: 38.51 KG/M2 | SYSTOLIC BLOOD PRESSURE: 140 MMHG | DIASTOLIC BLOOD PRESSURE: 70 MMHG | HEART RATE: 71 BPM | RESPIRATION RATE: 19 BRPM | TEMPERATURE: 98 F | WEIGHT: 260 LBS | OXYGEN SATURATION: 100 % | HEIGHT: 69 IN

## 2019-09-10 DIAGNOSIS — R07.9 CHEST PAIN: ICD-10-CM

## 2019-09-10 DIAGNOSIS — R07.9 CHEST PAIN, UNSPECIFIED TYPE: Primary | ICD-10-CM

## 2019-09-10 LAB
ALBUMIN SERPL BCP-MCNC: 3.4 G/DL (ref 3.5–5.2)
ALP SERPL-CCNC: 65 U/L (ref 55–135)
ALT SERPL W/O P-5'-P-CCNC: 17 U/L (ref 10–44)
AMPHET+METHAMPHET UR QL: NEGATIVE
ANION GAP SERPL CALC-SCNC: 8 MMOL/L (ref 8–16)
AST SERPL-CCNC: 20 U/L (ref 10–40)
B-HCG UR QL: NEGATIVE
BARBITURATES UR QL SCN>200 NG/ML: NEGATIVE
BASOPHILS # BLD AUTO: 0.01 K/UL (ref 0–0.2)
BASOPHILS NFR BLD: 0.2 % (ref 0–1.9)
BENZODIAZ UR QL SCN>200 NG/ML: NEGATIVE
BILIRUB SERPL-MCNC: 0.2 MG/DL (ref 0.1–1)
BILIRUB UR QL STRIP: NEGATIVE
BUN SERPL-MCNC: 9 MG/DL (ref 6–20)
BZE UR QL SCN: NEGATIVE
CALCIUM SERPL-MCNC: 8.4 MG/DL (ref 8.7–10.5)
CANNABINOIDS UR QL SCN: NEGATIVE
CHLORIDE SERPL-SCNC: 106 MMOL/L (ref 95–110)
CLARITY UR: ABNORMAL
CO2 SERPL-SCNC: 24 MMOL/L (ref 23–29)
COLOR UR: YELLOW
CREAT SERPL-MCNC: 0.7 MG/DL (ref 0.5–1.4)
CREAT UR-MCNC: 223.6 MG/DL (ref 15–325)
DIFFERENTIAL METHOD: ABNORMAL
EOSINOPHIL # BLD AUTO: 0.1 K/UL (ref 0–0.5)
EOSINOPHIL NFR BLD: 2.4 % (ref 0–8)
ERYTHROCYTE [DISTWIDTH] IN BLOOD BY AUTOMATED COUNT: 16.9 % (ref 11.5–14.5)
EST. GFR  (AFRICAN AMERICAN): >60 ML/MIN/1.73 M^2
EST. GFR  (NON AFRICAN AMERICAN): >60 ML/MIN/1.73 M^2
GLUCOSE SERPL-MCNC: 110 MG/DL (ref 70–110)
GLUCOSE UR QL STRIP: NEGATIVE
HCG INTACT+B SERPL-ACNC: <1.2 MIU/ML
HCT VFR BLD AUTO: 33.7 % (ref 37–48.5)
HGB BLD-MCNC: 10.4 G/DL (ref 12–16)
HGB UR QL STRIP: ABNORMAL
KETONES UR QL STRIP: NEGATIVE
LEUKOCYTE ESTERASE UR QL STRIP: ABNORMAL
LYMPHOCYTES # BLD AUTO: 2.1 K/UL (ref 1–4.8)
LYMPHOCYTES NFR BLD: 44.3 % (ref 18–48)
MCH RBC QN AUTO: 24.2 PG (ref 27–31)
MCHC RBC AUTO-ENTMCNC: 30.9 G/DL (ref 32–36)
MCV RBC AUTO: 78 FL (ref 82–98)
METHADONE UR QL SCN>300 NG/ML: NEGATIVE
MICROSCOPIC COMMENT: ABNORMAL
MONOCYTES # BLD AUTO: 0.4 K/UL (ref 0.3–1)
MONOCYTES NFR BLD: 8 % (ref 4–15)
NEUTROPHILS # BLD AUTO: 2.1 K/UL (ref 1.8–7.7)
NEUTROPHILS NFR BLD: 45.3 % (ref 38–73)
NITRITE UR QL STRIP: NEGATIVE
OPIATES UR QL SCN: NEGATIVE
PCP UR QL SCN>25 NG/ML: NEGATIVE
PH UR STRIP: 5 [PH] (ref 5–8)
PLATELET # BLD AUTO: 249 K/UL (ref 150–350)
PMV BLD AUTO: 9.6 FL (ref 9.2–12.9)
POTASSIUM SERPL-SCNC: 3.8 MMOL/L (ref 3.5–5.1)
PROT SERPL-MCNC: 7.8 G/DL (ref 6–8.4)
PROT UR QL STRIP: NEGATIVE
RBC # BLD AUTO: 4.3 M/UL (ref 4–5.4)
RBC #/AREA URNS HPF: 60 /HPF (ref 0–4)
SODIUM SERPL-SCNC: 138 MMOL/L (ref 136–145)
SP GR UR STRIP: 1.02 (ref 1–1.03)
SQUAMOUS #/AREA URNS HPF: 4 /HPF
TOXICOLOGY INFORMATION: NORMAL
TROPONIN I SERPL DL<=0.01 NG/ML-MCNC: <0.006 NG/ML (ref 0–0.03)
TROPONIN I SERPL DL<=0.01 NG/ML-MCNC: <0.006 NG/ML (ref 0–0.03)
URN SPEC COLLECT METH UR: ABNORMAL
UROBILINOGEN UR STRIP-ACNC: NEGATIVE EU/DL
WBC # BLD AUTO: 4.65 K/UL (ref 3.9–12.7)
WBC #/AREA URNS HPF: 2 /HPF (ref 0–5)

## 2019-09-10 PROCEDURE — 84702 CHORIONIC GONADOTROPIN TEST: CPT

## 2019-09-10 PROCEDURE — 80053 COMPREHEN METABOLIC PANEL: CPT

## 2019-09-10 PROCEDURE — 93010 ELECTROCARDIOGRAM REPORT: CPT | Mod: ,,, | Performed by: INTERNAL MEDICINE

## 2019-09-10 PROCEDURE — 25000003 PHARM REV CODE 250: Performed by: EMERGENCY MEDICINE

## 2019-09-10 PROCEDURE — 81000 URINALYSIS NONAUTO W/SCOPE: CPT | Mod: 59

## 2019-09-10 PROCEDURE — 99283 EMERGENCY DEPT VISIT LOW MDM: CPT | Mod: 25

## 2019-09-10 PROCEDURE — 81025 URINE PREGNANCY TEST: CPT

## 2019-09-10 PROCEDURE — 93005 ELECTROCARDIOGRAM TRACING: CPT

## 2019-09-10 PROCEDURE — 80307 DRUG TEST PRSMV CHEM ANLYZR: CPT

## 2019-09-10 PROCEDURE — 93010 EKG 12-LEAD: ICD-10-PCS | Mod: ,,, | Performed by: INTERNAL MEDICINE

## 2019-09-10 PROCEDURE — 85025 COMPLETE CBC W/AUTO DIFF WBC: CPT

## 2019-09-10 PROCEDURE — 84484 ASSAY OF TROPONIN QUANT: CPT | Mod: 91

## 2019-09-10 RX ORDER — ASPIRIN 325 MG
325 TABLET ORAL
Status: COMPLETED | OUTPATIENT
Start: 2019-09-10 | End: 2019-09-10

## 2019-09-10 RX ADMIN — ASPIRIN 325 MG ORAL TABLET 325 MG: 325 PILL ORAL at 09:09

## 2019-09-10 NOTE — ED TRIAGE NOTES
"Pt arrived to ED with c/o intermittent chest pain and SOB x 8 months. Pt reports multiple visits to ER/doctors and states, "they can not find out what the cause is."  NAD.    "

## 2019-09-10 NOTE — ED PROVIDER NOTES
Encounter Date: 9/10/2019    SCRIBE #1 NOTE: I, Awa Gonzalez, am scribing for, and in the presence of,  Nimisha Wilkes MD. I have scribed the following portions of the note - the EKG reading. Other sections scribed: HPI, ROS, PE.       History     Chief Complaint   Patient presents with    Chest Pain     intermittent Midsternal CP with associated SOB x 6 months.      This 44 year old female who has anemia presents to the emergency department for an evaluation for intermittent left sided chest pain for the past 8 months.  Patient reports her current episode began at 0550 today.  She describes the pain as being sharp, stabbing, and rates it as a 10/10 during onset.  She states the episode this morning began while getting her self ready for work and her daughter ready for school.  She states the episode lasting 15-20 seconds in duration before it spontaneously resolving.  She reports having an intermittent headache that sometimes occurs with onset of these episodes of chest pain.  She reports coming to the ED today out of concern due to her family h/o cardiac related ailments, including cardiac disease and MI.  She reports her mother passed away from cardiac related diseases.  Patient denies prior treatment or use of nitroglycerin.  She endorses increased stressors and depression, but reports she is currently not medicated for this.  No alleviating factors.  Patient states she has been evaluated in the past for similar chest pains and states she had imaging performed including MRI and CT imaging.      The history is provided by the patient. No  was used.     Review of patient's allergies indicates:  No Known Allergies  Past Medical History:   Diagnosis Date    Anemia     Bronchitis     Syncope 2019     Past Surgical History:   Procedure Laterality Date     SECTION  2008    DEBRIDEMENT OF CARPAL BONE Right 2015    long finger, s/p traumatic injury     DEBRIDEMENT-CARPAL Right 4/1/2015    Performed by Nikolai Ha [facility] at [facility]    MYOMECTOMY  2007    PERCUTANEOUS PINNING OF FINGER Right 04/01/2015    long finger, s/p traumatic injury    PINNING-PERCUTANEOUS-FINGER Right 4/1/2015    Performed by Nikolai Ha MD at [facility] OR     Family History   Problem Relation Age of Onset    Stroke Maternal Grandmother     Diabetes Maternal Grandmother     Breast cancer Maternal Grandmother     Diabetes Mother     Hypertension Mother     Diabetes Sister     Colon cancer Neg Hx     Ovarian cancer Neg Hx      Social History     Tobacco Use    Smoking status: Never Smoker    Smokeless tobacco: Never Used   Substance Use Topics    Alcohol use: Yes     Comment: socially    Drug use: No     Review of Systems   Constitutional: Negative for fever.   HENT: Negative for sore throat.    Respiratory: Negative for shortness of breath.    Cardiovascular: Positive for chest pain.   Gastrointestinal: Negative for nausea.   Genitourinary: Negative for dysuria.   Musculoskeletal: Negative for back pain.   Skin: Negative for rash.   Neurological: Positive for headaches. Negative for weakness.   Hematological: Does not bruise/bleed easily.       Physical Exam     Initial Vitals [09/10/19 0908]   BP Pulse Resp Temp SpO2   120/72 76 17 98 °F (36.7 °C) 99 %      MAP       --         Physical Exam    Constitutional: She appears well-nourished. She does not appear ill.   HENT:   Head: Normocephalic.   Eyes: EOM are normal. Pupils are equal, round, and reactive to light.   Neck: Normal range of motion. Neck supple.   Cardiovascular: Normal heart sounds and intact distal pulses.   No murmur heard.  Pulses:       Radial pulses are 2+ on the right side, and 2+ on the left side.        Dorsalis pedis pulses are 2+ on the right side, and 2+ on the left side.   Pulmonary/Chest: Breath sounds normal. No respiratory distress.   Abdominal: Soft. There is no tenderness.    Musculoskeletal: Normal range of motion. She exhibits no edema.   Neurological: She is alert and oriented to person, place, and time.   Skin: Skin is warm and dry.         ED Course   Procedures  Labs Reviewed   CBC W/ AUTO DIFFERENTIAL - Abnormal; Notable for the following components:       Result Value    Hemoglobin 10.4 (*)     Hematocrit 33.7 (*)     Mean Corpuscular Volume 78 (*)     Mean Corpuscular Hemoglobin 24.2 (*)     Mean Corpuscular Hemoglobin Conc 30.9 (*)     RDW 16.9 (*)     All other components within normal limits   COMPREHENSIVE METABOLIC PANEL - Abnormal; Notable for the following components:    Calcium 8.4 (*)     Albumin 3.4 (*)     All other components within normal limits   HCG, QUANTITATIVE, PREGNANCY   TROPONIN I   DRUG SCREEN PANEL, URINE EMERGENCY   URINALYSIS, REFLEX TO URINE CULTURE   PREGNANCY TEST, URINE RAPID     EKG Readings: (Independently Interpreted)   EKG done at 0952 shows Normal Sinus Rhythm with a rate of 70 bpm.  No ST elevation or depression.  No T wave abnormalities.  EKG similar to previous.        44-year-old female with history of self diagnosed depression presents to the ED with left-sided sharp stabbing chest pain since 6 this morning.  She states she has had episodes like this for last 8 months workup with no explanation as to why this is happening.  She is nervous because multiple people in her family who  from heart attacks and heart disease.  My differential includes ACS versus depression versus musculoskeletal pain. Other vitals and physical exam do not suggest anything pleuritic such as a PE or pneumonia at this time.  PERC negative. Will evaluate her chest pain with labs, EKG and chest x-ray.  Will provide aspirin and re-evaluated.  SHAHEEN Wilkes MD  9:36 AM    Patient's 2nd troponin is negative. I discussed the findings today with her and told her to follow up with her physician to continue to screen her for heart disease as her family has a strong family  history.  She should come back to the ER with any other concerns.  SHAHEEN Wilkes MD  1:08 PM            Imaging Results          X-Ray Chest PA And Lateral (Final result)  Result time 09/10/19 10:00:17    Final result by Zoran Kennedy MD (09/10/19 10:00:17)                 Impression:      1. No acute cardiopulmonary process appreciated.      Electronically signed by: Zoran Kennedy  Date:    09/10/2019  Time:    10:00             Narrative:    EXAMINATION:  XR CHEST PA AND LATERAL    CLINICAL HISTORY:  Chest pain, unspecified    TECHNIQUE:  PA and lateral views of the chest were performed.    COMPARISON:  Chest radiograph 03/08/2019    FINDINGS:  Cardiac silhouette is top-normal in size, unchanged.  Mediastinal silhouette is within normal limits.    No focal consolidation, overt interstitial edema, sizable pleural effusion or pneumothorax.    Visualized osseous structures are grossly unremarkable.                                            Scribe Attestation:   Scribe #1: I performed the above scribed service and the documentation accurately describes the services I performed. I attest to the accuracy of the note.    I, Nimisha Wilkes, personally performed the services described in this documentation. All medical record entries made by the scribe were at my direction and in my presence.  I have reviewed the chart and agree that the record reflects my personal performance and is accurate and complete.           Clinical Impression:       ICD-10-CM ICD-9-CM   1. Chest pain R07.9 786.50   2. Chest pain R07.9 786.50                                Nimisha Wilkes MD  09/10/19 6501

## 2019-09-12 ENCOUNTER — HOSPITAL ENCOUNTER (EMERGENCY)
Facility: HOSPITAL | Age: 44
Discharge: HOME | End: 2019-09-12
Attending: EMERGENCY MEDICINE
Payer: MEDICAID

## 2019-09-12 VITALS
HEIGHT: 69 IN | OXYGEN SATURATION: 100 % | DIASTOLIC BLOOD PRESSURE: 72 MMHG | BODY MASS INDEX: 37.33 KG/M2 | WEIGHT: 252 LBS | SYSTOLIC BLOOD PRESSURE: 120 MMHG | RESPIRATION RATE: 16 BRPM | HEART RATE: 72 BPM | TEMPERATURE: 98 F

## 2019-09-12 DIAGNOSIS — M79.602 PAIN OF LEFT UPPER EXTREMITY: Primary | ICD-10-CM

## 2019-09-12 DIAGNOSIS — S50.12XA CONTUSION OF LEFT FOREARM, INITIAL ENCOUNTER: ICD-10-CM

## 2019-09-12 PROCEDURE — 99283 EMERGENCY DEPT VISIT LOW MDM: CPT

## 2019-09-12 NOTE — ED TRIAGE NOTES
Pt c/o LEFT arm pain after having an IV in antecubital on Tuesday. Bruising noted to area. Pain is 8/10

## 2019-09-12 NOTE — ED PROVIDER NOTES
Encounter Date: 2019    SCRIBE #1 NOTE: I, Rosalinda Guerra, am scribing for, and in the presence of,  Praneeth Nguyen PA-C . I have scribed the following portions of the note - Other sections scribed: HPI ROS .       History     Chief Complaint   Patient presents with    Arm Pain     left arm pain after blood draw on tuesday this week, reports numbness     Chief Complaint: Left arm pain   History of Present illness: This is a 44 y.o. Female with a PMHX of anemia, bronchitis, and syncope who presents to the ED complaining of left arm pain after having an IV placed in her left forearm 2 days ago.  She also notes numbness in her left hand and brusing on her arm. She denies fever, allergies, medical problems or surgeries. She denies smoking. She reports drinking alcohol.  Denies history of IV drug use    The history is provided by the patient. No  was used.     Review of patient's allergies indicates:  No Known Allergies  Past Medical History:   Diagnosis Date    Anemia     Bronchitis     Syncope 2019     Past Surgical History:   Procedure Laterality Date     SECTION  2008    DEBRIDEMENT OF CARPAL BONE Right 2015    long finger, s/p traumatic injury    DEBRIDEMENT-CARPAL Right 2015    Performed by [facility] at [facility] OR    MYOMECTOMY  2007    PERCUTANEOUS PINNING OF FINGER Right 2015    long finger, s/p traumatic injury    PINNING-PERCUTANEOUS-FINGER Right 2015    Performed by Nik[facility]ai Ha MD at [facility] OR     Family History   Problem Relation Age of Onset    Stroke Maternal Grandmother     Diabetes Maternal Grandmother     Breast cancer Maternal Grandmother     Diabetes Mother     Hypertension Mother     Diabetes Sister     Colon cancer Neg Hx     Ovarian cancer Neg Hx      Social History     Tobacco Use    Smoking status: Never Smoker    Smokeless tobacco: Never Used   Substance Use Topics    Alcohol use: Yes      Comment: socially    Drug use: No     Review of Systems   Constitutional: Negative for fever.   HENT: Negative for rhinorrhea.    Respiratory: Negative for cough and shortness of breath.    Cardiovascular: Negative for chest pain.   Gastrointestinal: Negative for diarrhea, nausea and vomiting.   Musculoskeletal: Negative for back pain.        Left arm pain    Skin: Negative for pallor.   Neurological: Negative for headaches.       Physical Exam     Initial Vitals [09/12/19 0714]   BP Pulse Resp Temp SpO2   119/69 68 17 97.8 °F (36.6 °C) 99 %      MAP       --         Physical Exam    Nursing note and vitals reviewed.  Constitutional: She appears well-developed and well-nourished. No distress.   HENT:   Head: Normocephalic and atraumatic.   Nose: Nose normal.   Mouth/Throat: Oropharynx is clear and moist.   Eyes: EOM are normal. Pupils are equal, round, and reactive to light.   Neck: Normal range of motion. Neck supple.   Cardiovascular: Normal rate, regular rhythm and normal heart sounds. Exam reveals no gallop and no friction rub.    No murmur heard.  Pulses:       Radial pulses are 2+ on the right side, and 2+ on the left side.   Pulmonary/Chest: Effort normal and breath sounds normal. No respiratory distress. She has no wheezes. She has no rhonchi. She has no rales.   Abdominal: Soft. Bowel sounds are normal. There is no tenderness. There is no rebound and no guarding.   Musculoskeletal: Normal range of motion. She exhibits no edema.   Patient has full range of motion of the left elbow and left wrist.  All compartments are soft.   Neurological: She is alert and oriented to person, place, and time. She has normal strength. No cranial nerve deficit or sensory deficit.   Patient has equal  strength bilaterally.   Skin: Skin is warm and dry. Capillary refill takes less than 2 seconds.   There is a an area of ecchymoses measuring approximately 4 cm x 4 cm to the left forearm.  There is no erythema or purulent  drainage.   Psychiatric: She has a normal mood and affect. Her behavior is normal.         ED Course   Procedures  Labs Reviewed - No data to display       Imaging Results    None          Medical Decision Making:   Differential Diagnosis:   Differential diagnosis includes was not limited to:  Fracture, dislocation, contusion, cellulitis, compartment syndrome  ED Management:  This is an evaluation of a 44 y.o. female who presents to the ED for left forearm pain.  Vital signs are stable.   Afebrile.  Patient is nontoxic appearing and in no acute distress. There is an area of ecchymoses to the left forearm at site of previous IV insertion.  No erythema, warmth or drainage. Patient has full range of motion of the left elbow and left wrist.  Patient is neurovascularly intact. All compartments are soft.  Etiology patient's pain likely secondary to soft tissue contusion from IV site.  I do not believe there is extensive cellulitis or septic joint that requires hospital admission at this time.  I encourage patient to apply warm compresses to her arm.  Encouraged to take ibuprofen at home for pain.    Patient given return precautions and instructed to return to the emergency department for any new or worsening symptoms. Patient verbalized understanding and agreed with plan.                 Scribe Attestation:   Scribe #1: I performed the above scribed service and the documentation accurately describes the services I performed. I attest to the accuracy of the note.               Clinical Impression:       ICD-10-CM ICD-9-CM   1. Pain of left upper extremity M79.602 729.5   2. Contusion of left forearm, initial encounter S50.12XA 923.10              I, Praneeth Pickard , personally performed the services described in this documentation. All medical record entries made by the scribe were at my direction and in my presence.  I have reviewed the chart and agree that the record reflects my personal performance and is accurate and  complete                   Praneeth Pickard PA-C  09/12/19 111

## 2019-12-09 ENCOUNTER — HOSPITAL ENCOUNTER (EMERGENCY)
Facility: HOSPITAL | Age: 44
Discharge: HOME | End: 2019-12-09
Attending: EMERGENCY MEDICINE

## 2019-12-09 VITALS
TEMPERATURE: 99 F | BODY MASS INDEX: 37.03 KG/M2 | DIASTOLIC BLOOD PRESSURE: 70 MMHG | WEIGHT: 250 LBS | OXYGEN SATURATION: 97 % | SYSTOLIC BLOOD PRESSURE: 133 MMHG | HEART RATE: 72 BPM | RESPIRATION RATE: 21 BRPM | HEIGHT: 69 IN

## 2019-12-09 DIAGNOSIS — R07.9 CHEST PAIN: Primary | ICD-10-CM

## 2019-12-09 DIAGNOSIS — R00.2 PALPITATIONS: ICD-10-CM

## 2019-12-09 LAB
ALBUMIN SERPL BCP-MCNC: 3.7 G/DL (ref 3.5–5.2)
ALP SERPL-CCNC: 76 U/L (ref 55–135)
ALT SERPL W/O P-5'-P-CCNC: 24 U/L (ref 10–44)
ANION GAP SERPL CALC-SCNC: 6 MMOL/L (ref 8–16)
AST SERPL-CCNC: 27 U/L (ref 10–40)
B-HCG UR QL: NEGATIVE
BASOPHILS # BLD AUTO: 0.02 K/UL (ref 0–0.2)
BASOPHILS NFR BLD: 0.4 % (ref 0–1.9)
BILIRUB SERPL-MCNC: 0.3 MG/DL (ref 0.1–1)
BILIRUB UR QL STRIP: NEGATIVE
BNP SERPL-MCNC: 29 PG/ML (ref 0–99)
BUN SERPL-MCNC: 7 MG/DL (ref 6–20)
CALCIUM SERPL-MCNC: 9 MG/DL (ref 8.7–10.5)
CHLORIDE SERPL-SCNC: 105 MMOL/L (ref 95–110)
CLARITY UR: CLEAR
CO2 SERPL-SCNC: 27 MMOL/L (ref 23–29)
COLOR UR: YELLOW
CREAT SERPL-MCNC: 0.7 MG/DL (ref 0.5–1.4)
CTP QC/QA: YES
DIFFERENTIAL METHOD: ABNORMAL
EOSINOPHIL # BLD AUTO: 0.1 K/UL (ref 0–0.5)
EOSINOPHIL NFR BLD: 1.9 % (ref 0–8)
ERYTHROCYTE [DISTWIDTH] IN BLOOD BY AUTOMATED COUNT: 16.3 % (ref 11.5–14.5)
EST. GFR  (AFRICAN AMERICAN): >60 ML/MIN/1.73 M^2
EST. GFR  (NON AFRICAN AMERICAN): >60 ML/MIN/1.73 M^2
GLUCOSE SERPL-MCNC: 88 MG/DL (ref 70–110)
GLUCOSE UR QL STRIP: NEGATIVE
HCT VFR BLD AUTO: 36.6 % (ref 37–48.5)
HGB BLD-MCNC: 11.1 G/DL (ref 12–16)
HGB UR QL STRIP: NEGATIVE
IMM GRANULOCYTES # BLD AUTO: 0.02 K/UL (ref 0–0.04)
IMM GRANULOCYTES NFR BLD AUTO: 0.4 % (ref 0–0.5)
KETONES UR QL STRIP: NEGATIVE
LEUKOCYTE ESTERASE UR QL STRIP: NEGATIVE
LYMPHOCYTES # BLD AUTO: 2.9 K/UL (ref 1–4.8)
LYMPHOCYTES NFR BLD: 51.1 % (ref 18–48)
MCH RBC QN AUTO: 24.3 PG (ref 27–31)
MCHC RBC AUTO-ENTMCNC: 30.3 G/DL (ref 32–36)
MCV RBC AUTO: 80 FL (ref 82–98)
MONOCYTES # BLD AUTO: 0.4 K/UL (ref 0.3–1)
MONOCYTES NFR BLD: 7.8 % (ref 4–15)
NEUTROPHILS # BLD AUTO: 2.2 K/UL (ref 1.8–7.7)
NEUTROPHILS NFR BLD: 38.4 % (ref 38–73)
NITRITE UR QL STRIP: NEGATIVE
NRBC BLD-RTO: 0 /100 WBC
PH UR STRIP: 7 [PH] (ref 5–8)
PLATELET # BLD AUTO: 264 K/UL (ref 150–350)
PMV BLD AUTO: 9.9 FL (ref 9.2–12.9)
POTASSIUM SERPL-SCNC: 4.1 MMOL/L (ref 3.5–5.1)
PROT SERPL-MCNC: 8.3 G/DL (ref 6–8.4)
PROT UR QL STRIP: NEGATIVE
RBC # BLD AUTO: 4.56 M/UL (ref 4–5.4)
SODIUM SERPL-SCNC: 138 MMOL/L (ref 136–145)
SP GR UR STRIP: 1.01 (ref 1–1.03)
TROPONIN I SERPL DL<=0.01 NG/ML-MCNC: <0.006 NG/ML (ref 0–0.03)
URN SPEC COLLECT METH UR: NORMAL
UROBILINOGEN UR STRIP-ACNC: NEGATIVE EU/DL
WBC # BLD AUTO: 5.67 K/UL (ref 3.9–12.7)

## 2019-12-09 PROCEDURE — 99285 EMERGENCY DEPT VISIT HI MDM: CPT | Mod: 25

## 2019-12-09 PROCEDURE — 85025 COMPLETE CBC W/AUTO DIFF WBC: CPT

## 2019-12-09 PROCEDURE — 80053 COMPREHEN METABOLIC PANEL: CPT

## 2019-12-09 PROCEDURE — 93005 ELECTROCARDIOGRAM TRACING: CPT

## 2019-12-09 PROCEDURE — 83880 ASSAY OF NATRIURETIC PEPTIDE: CPT

## 2019-12-09 PROCEDURE — 81003 URINALYSIS AUTO W/O SCOPE: CPT

## 2019-12-09 PROCEDURE — 84484 ASSAY OF TROPONIN QUANT: CPT

## 2019-12-09 PROCEDURE — 93010 EKG 12-LEAD: ICD-10-PCS | Mod: ,,, | Performed by: INTERNAL MEDICINE

## 2019-12-09 PROCEDURE — 96374 THER/PROPH/DIAG INJ IV PUSH: CPT

## 2019-12-09 PROCEDURE — 63600175 PHARM REV CODE 636 W HCPCS: Performed by: EMERGENCY MEDICINE

## 2019-12-09 PROCEDURE — 93010 ELECTROCARDIOGRAM REPORT: CPT | Mod: ,,, | Performed by: INTERNAL MEDICINE

## 2019-12-09 PROCEDURE — 81025 URINE PREGNANCY TEST: CPT | Performed by: EMERGENCY MEDICINE

## 2019-12-09 RX ORDER — KETOROLAC TROMETHAMINE 30 MG/ML
15 INJECTION, SOLUTION INTRAMUSCULAR; INTRAVENOUS
Status: COMPLETED | OUTPATIENT
Start: 2019-12-09 | End: 2019-12-09

## 2019-12-09 RX ADMIN — KETOROLAC TROMETHAMINE 15 MG: 30 INJECTION, SOLUTION INTRAMUSCULAR; INTRAVENOUS at 11:12

## 2019-12-09 NOTE — ED PROVIDER NOTES
"Encounter Date: 2019    SCRIBE #1 NOTE: I, Twyla Bryant, am scribing for, and in the presence of,  Isai Rosales MD. I have scribed the following portions of the note - Other sections scribed: HPI, ROS, and PE.       History     Chief Complaint   Patient presents with    Chest Pain     Pt reports chest pain and shortness of breath. Pt states " I feel a flutter in my chest at night and it hurts when I breathe".      CC: Chest Pain    HPI: This 44 y.o female with a past medical history of Anemia, Bronchitis, and Syncope, presents to the ED with complaints of mid-sternal chest pain with  associated shortness of breath for the last few days. Pt reports experiencing "a flutter in my chest" which she notes is exacerbated when lying down and with palpation. However pt states the symptoms improve when sitting up. The pt's symptoms are acute, constant, and severe (10/10). Pt reports experiencing episodes of chest pain in the past, but she notes that the present episode is different as she has never experienced fluttering. Pt notes that she sits for long periods of time at work. Pt denies leg swelling and abdominal pain. Pt takes medication for treatment of anxiety. No other associated symptoms. No attempted treatment PTA.    The history is provided by the patient.     Review of patient's allergies indicates:  No Known Allergies  Past Medical History:   Diagnosis Date    Anemia     Bronchitis     Syncope 2019     Past Surgical History:   Procedure Laterality Date     SECTION  2008    DEBRIDEMENT OF CARPAL BONE Right 2015    long finger, s/p traumatic injury    MYOMECTOMY  2007    PERCUTANEOUS PINNING OF FINGER Right 2015    long finger, s/p traumatic injury     Family History   Problem Relation Age of Onset    Stroke Maternal Grandmother     Diabetes Maternal Grandmother     Breast cancer Maternal Grandmother     Diabetes Mother     Hypertension Mother     Diabetes " Sister     Colon cancer Neg Hx     Ovarian cancer Neg Hx      Social History     Tobacco Use    Smoking status: Never Smoker    Smokeless tobacco: Never Used   Substance Use Topics    Alcohol use: Yes     Comment: socially    Drug use: No     Review of Systems   Constitutional: Negative.    HENT: Negative.    Eyes: Negative.    Respiratory: Positive for shortness of breath.    Cardiovascular: Positive for chest pain. Negative for leg swelling.        (+) fluttering   Gastrointestinal: Negative.  Negative for abdominal pain.   Genitourinary: Negative.    Musculoskeletal: Negative.    Skin: Negative.    Neurological: Negative.        Physical Exam     Initial Vitals [12/09/19 0851]   BP Pulse Resp Temp SpO2   124/73 71 16 98.4 °F (36.9 °C) 100 %      MAP       --         Physical Exam    Nursing note and vitals reviewed.  Constitutional: She appears well-developed and well-nourished. She is not diaphoretic. No distress.   HENT:   Head: Normocephalic and atraumatic.   Mouth/Throat: Oropharynx is clear and moist.   Eyes: EOM are normal. Pupils are equal, round, and reactive to light. No scleral icterus.   Neck: Normal range of motion. Neck supple. No JVD present.   Cardiovascular: Normal rate, regular rhythm and intact distal pulses.   Pulmonary/Chest: Breath sounds normal. No stridor. No respiratory distress.   There is left chest wall tenderness present.   Abdominal: Soft. Bowel sounds are normal. She exhibits no distension. There is no tenderness.   Musculoskeletal: Normal range of motion. She exhibits no edema or tenderness.   Neurological: She is alert. She has normal strength.   Skin: Skin is warm and dry.   Psychiatric: She has a normal mood and affect.         ED Course   Procedures  Labs Reviewed   CBC W/ AUTO DIFFERENTIAL - Abnormal; Notable for the following components:       Result Value    Hemoglobin 11.1 (*)     Hematocrit 36.6 (*)     Mean Corpuscular Volume 80 (*)     Mean Corpuscular Hemoglobin  24.3 (*)     Mean Corpuscular Hemoglobin Conc 30.3 (*)     RDW 16.3 (*)     Lymph% 51.1 (*)     All other components within normal limits   COMPREHENSIVE METABOLIC PANEL - Abnormal; Notable for the following components:    Anion Gap 6 (*)     All other components within normal limits   TROPONIN I   B-TYPE NATRIURETIC PEPTIDE   URINALYSIS, REFLEX TO URINE CULTURE    Narrative:     Preferred Collection Type->Urine, Clean Catch   POCT URINE PREGNANCY        ECG Results          EKG 12-lead (In process)  Result time 12/09/19 11:28:30    In process by Interface, Lab In [facility] (12/09/19 11:28:30)                 Narrative:    Test Reason : R07.9,    Vent. Rate : 071 BPM     Atrial Rate : 071 BPM     P-R Int : 158 ms          QRS Dur : 084 ms      QT Int : 414 ms       P-R-T Axes : 067 090 061 degrees     QTc Int : 449 ms    Normal sinus rhythm  Rightward axis  Borderline Abnormal ECG  When compared with ECG of 10-SEP-2019 09:52,  No significant change was found    Referred By: [email or web address]   SELF           Confirmed By:                   In process by Interface, Lab In [facility] (12/09/19 11:26:36)                 Narrative:    Test Reason : R07.9,    Vent. Rate : 071 BPM     Atrial Rate : 071 BPM     P-R Int : 158 ms          QRS Dur : 084 ms      QT Int : 414 ms       P-R-T Axes : 067 090 061 degrees     QTc Int : 449 ms    Normal sinus rhythm  Rightward axis  Borderline Abnormal ECG  When compared with ECG of 10-SEP-2019 09:52,  No significant change was found    Referred By: [email or web address]   SELF           Confirmed By:                             Imaging Results          X-Ray Chest AP Portable (Final result)  Result time 12/09/19 09:52:14    Final result by Armen Marquez MD (12/09/19 09:52:14)                 Impression:      No acute findings.      Electronically signed by: Armen Marquez MD  Date:    12/09/2019  Time:    09:52             Narrative:    EXAMINATION:  XR CHEST AP PORTABLE    CLINICAL HISTORY:  Chest  pain, unspecified    TECHNIQUE:  Single frontal view of the chest was performed.    COMPARISON:  09/10/2019    FINDINGS:  Cardiac size is at the upper limits of normal.  Lungs are clear no infiltrate or cardiac failure is seen.                                            Scribe Attestation:   Scribe #1: I performed the above scribed service and the documentation accurately describes the services I performed. I attest to the accuracy of the note.      MDM:    44 y.o.female with PMHx as noted above, presents with chest pain x 2 weeks. Physical exam remarkable for wel lappearing female, conversing with ease, ttp over left chest wall, CTAB, RRR, abdomen soft, nt/nd.  ED workup remarkable for EKG - nsr, no ischemic changes compared to prior, no STEMI, trop - neg, BNP - wnl, CBC/CMP - wnl, CXR - unremarkable. Pt presentation consistent with atypical chest pain, with reported palpitations with unremarkable ED telemetry observation, with cardiology referral placed.  Pt chest pain treated as costochondritis with exam consistent.  At this time given patient's history, physical exam, and ED workup do not suspect arrhythmia, MI/ACS, PE, PTX, aortic dissection, pericarditis, PNA, shingles, or any further malignant cause. Discussed diagnosis and further treatment with patient, return precautions given and all questions answered.  Patient in understanding of plan.  Pt discharged to home improved and stable.                        Clinical Impression:       ICD-10-CM ICD-9-CM   1. Chest pain R07.9 786.50   2. Palpitations R00.2 785.1            I Isai Rosales M.D.,personally performed the services described in this documentation. All medical record entries made by the scribe were at my direction and in my presence. I have reviewed the chart and agree that the record reflects my personal performance and is accurate and complete                 Isai Rosales MD  12/09/19 9611

## 2019-12-09 NOTE — ED TRIAGE NOTES
Pt arrived to ED with c/o midsternal chest pain x 3 days associated with SOB. Pt reports pain when pushing on chest. NAD.

## 2022-02-17 ENCOUNTER — HOSPITAL ENCOUNTER (EMERGENCY)
Facility: HOSPITAL | Age: 47
Discharge: HOME | End: 2022-02-17
Attending: EMERGENCY MEDICINE
Payer: COMMERCIAL

## 2022-02-17 VITALS
OXYGEN SATURATION: 100 % | RESPIRATION RATE: 20 BRPM | TEMPERATURE: 98 F | HEIGHT: 69 IN | WEIGHT: 256 LBS | BODY MASS INDEX: 37.92 KG/M2 | SYSTOLIC BLOOD PRESSURE: 110 MMHG | DIASTOLIC BLOOD PRESSURE: 65 MMHG | HEART RATE: 61 BPM

## 2022-02-17 DIAGNOSIS — M54.9 UPPER BACK PAIN ON RIGHT SIDE: Primary | ICD-10-CM

## 2022-02-17 DIAGNOSIS — R07.9 CHEST PAIN: ICD-10-CM

## 2022-02-17 LAB
ALBUMIN SERPL BCP-MCNC: 3.7 G/DL (ref 3.5–5.2)
ALP SERPL-CCNC: 85 U/L (ref 55–135)
ALT SERPL W/O P-5'-P-CCNC: 24 U/L (ref 10–44)
ANION GAP SERPL CALC-SCNC: 8 MMOL/L (ref 8–16)
AST SERPL-CCNC: 23 U/L (ref 10–40)
B-HCG UR QL: NEGATIVE
BASOPHILS # BLD AUTO: 0.01 K/UL (ref 0–0.2)
BASOPHILS NFR BLD: 0.2 % (ref 0–1.9)
BILIRUB SERPL-MCNC: 0.3 MG/DL (ref 0.1–1)
BILIRUB UR QL STRIP: NEGATIVE
BNP SERPL-MCNC: <10 PG/ML (ref 0–99)
BUN SERPL-MCNC: 10 MG/DL (ref 6–20)
CALCIUM SERPL-MCNC: 9 MG/DL (ref 8.7–10.5)
CHLORIDE SERPL-SCNC: 105 MMOL/L (ref 95–110)
CLARITY UR: CLEAR
CO2 SERPL-SCNC: 27 MMOL/L (ref 23–29)
COLOR UR: YELLOW
CREAT SERPL-MCNC: 0.7 MG/DL (ref 0.5–1.4)
CTP QC/QA: YES
D DIMER PPP IA.FEU-MCNC: 0.4 MG/L FEU
DIFFERENTIAL METHOD: ABNORMAL
EOSINOPHIL # BLD AUTO: 0.1 K/UL (ref 0–0.5)
EOSINOPHIL NFR BLD: 1.9 % (ref 0–8)
ERYTHROCYTE [DISTWIDTH] IN BLOOD BY AUTOMATED COUNT: 14.9 % (ref 11.5–14.5)
EST. GFR  (AFRICAN AMERICAN): >60 ML/MIN/1.73 M^2
EST. GFR  (NON AFRICAN AMERICAN): >60 ML/MIN/1.73 M^2
GLUCOSE SERPL-MCNC: 103 MG/DL (ref 70–110)
GLUCOSE UR QL STRIP: NEGATIVE
HCT VFR BLD AUTO: 38.5 % (ref 37–48.5)
HGB BLD-MCNC: 12 G/DL (ref 12–16)
HGB UR QL STRIP: NEGATIVE
IMM GRANULOCYTES # BLD AUTO: 0 K/UL (ref 0–0.04)
IMM GRANULOCYTES NFR BLD AUTO: 0 % (ref 0–0.5)
KETONES UR QL STRIP: NEGATIVE
LEUKOCYTE ESTERASE UR QL STRIP: NEGATIVE
LYMPHOCYTES # BLD AUTO: 2.4 K/UL (ref 1–4.8)
LYMPHOCYTES NFR BLD: 46.2 % (ref 18–48)
MCH RBC QN AUTO: 25.3 PG (ref 27–31)
MCHC RBC AUTO-ENTMCNC: 31.2 G/DL (ref 32–36)
MCV RBC AUTO: 81 FL (ref 82–98)
MONOCYTES # BLD AUTO: 0.4 K/UL (ref 0.3–1)
MONOCYTES NFR BLD: 8.1 % (ref 4–15)
NEUTROPHILS # BLD AUTO: 2.3 K/UL (ref 1.8–7.7)
NEUTROPHILS NFR BLD: 43.6 % (ref 38–73)
NITRITE UR QL STRIP: NEGATIVE
NRBC BLD-RTO: 0 /100 WBC
PH UR STRIP: 7 [PH] (ref 5–8)
PLATELET # BLD AUTO: 236 K/UL (ref 150–450)
PMV BLD AUTO: 10.1 FL (ref 9.2–12.9)
POTASSIUM SERPL-SCNC: 3.9 MMOL/L (ref 3.5–5.1)
PROT SERPL-MCNC: 8.4 G/DL (ref 6–8.4)
PROT UR QL STRIP: NEGATIVE
RBC # BLD AUTO: 4.74 M/UL (ref 4–5.4)
SODIUM SERPL-SCNC: 140 MMOL/L (ref 136–145)
SP GR UR STRIP: 1.02 (ref 1–1.03)
TROPONIN I SERPL DL<=0.01 NG/ML-MCNC: <0.006 NG/ML (ref 0–0.03)
TROPONIN I SERPL DL<=0.01 NG/ML-MCNC: <0.006 NG/ML (ref 0–0.03)
URN SPEC COLLECT METH UR: NORMAL
UROBILINOGEN UR STRIP-ACNC: NEGATIVE EU/DL
WBC # BLD AUTO: 5.2 K/UL (ref 3.9–12.7)

## 2022-02-17 PROCEDURE — 81025 URINE PREGNANCY TEST: CPT | Performed by: EMERGENCY MEDICINE

## 2022-02-17 PROCEDURE — 99285 EMERGENCY DEPT VISIT HI MDM: CPT | Mod: 25

## 2022-02-17 PROCEDURE — 93010 ELECTROCARDIOGRAM REPORT: CPT | Mod: ,,, | Performed by: INTERNAL MEDICINE

## 2022-02-17 PROCEDURE — 25500020 PHARM REV CODE 255: Performed by: EMERGENCY MEDICINE

## 2022-02-17 PROCEDURE — 96375 TX/PRO/DX INJ NEW DRUG ADDON: CPT | Mod: 59

## 2022-02-17 PROCEDURE — 85379 FIBRIN DEGRADATION QUANT: CPT | Performed by: EMERGENCY MEDICINE

## 2022-02-17 PROCEDURE — 83880 ASSAY OF NATRIURETIC PEPTIDE: CPT | Performed by: EMERGENCY MEDICINE

## 2022-02-17 PROCEDURE — 96374 THER/PROPH/DIAG INJ IV PUSH: CPT | Mod: 59

## 2022-02-17 PROCEDURE — 63600175 PHARM REV CODE 636 W HCPCS: Performed by: EMERGENCY MEDICINE

## 2022-02-17 PROCEDURE — 25000003 PHARM REV CODE 250: Performed by: EMERGENCY MEDICINE

## 2022-02-17 PROCEDURE — 80053 COMPREHEN METABOLIC PANEL: CPT | Performed by: EMERGENCY MEDICINE

## 2022-02-17 PROCEDURE — 93005 ELECTROCARDIOGRAM TRACING: CPT

## 2022-02-17 PROCEDURE — 85025 COMPLETE CBC W/AUTO DIFF WBC: CPT | Performed by: EMERGENCY MEDICINE

## 2022-02-17 PROCEDURE — 81003 URINALYSIS AUTO W/O SCOPE: CPT | Performed by: EMERGENCY MEDICINE

## 2022-02-17 PROCEDURE — 93010 EKG 12-LEAD: ICD-10-PCS | Mod: ,,, | Performed by: INTERNAL MEDICINE

## 2022-02-17 PROCEDURE — 84484 ASSAY OF TROPONIN QUANT: CPT | Mod: 91 | Performed by: EMERGENCY MEDICINE

## 2022-02-17 RX ORDER — ASPIRIN 325 MG
325 TABLET ORAL
Status: COMPLETED | OUTPATIENT
Start: 2022-02-17 | End: 2022-02-17

## 2022-02-17 RX ORDER — KETOROLAC TROMETHAMINE 30 MG/ML
15 INJECTION, SOLUTION INTRAMUSCULAR; INTRAVENOUS
Status: COMPLETED | OUTPATIENT
Start: 2022-02-17 | End: 2022-02-17

## 2022-02-17 RX ORDER — LORAZEPAM 2 MG/ML
0.5 INJECTION INTRAMUSCULAR
Status: DISCONTINUED | OUTPATIENT
Start: 2022-02-17 | End: 2022-02-18 | Stop reason: HOSPADM

## 2022-02-17 RX ORDER — NAPROXEN 500 MG/1
500 TABLET ORAL 2 TIMES DAILY PRN
Qty: 30 TABLET | Refills: 0 | Status: SHIPPED | OUTPATIENT
Start: 2022-02-17

## 2022-02-17 RX ORDER — CYCLOBENZAPRINE HCL 10 MG
10 TABLET ORAL 3 TIMES DAILY PRN
Qty: 15 TABLET | Refills: 0 | Status: SHIPPED | OUTPATIENT
Start: 2022-02-17 | End: 2022-02-22

## 2022-02-17 RX ORDER — MORPHINE SULFATE 4 MG/ML
4 INJECTION, SOLUTION INTRAMUSCULAR; INTRAVENOUS
Status: COMPLETED | OUTPATIENT
Start: 2022-02-17 | End: 2022-02-17

## 2022-02-17 RX ADMIN — IOHEXOL 75 ML: 350 INJECTION, SOLUTION INTRAVENOUS at 01:02

## 2022-02-17 RX ADMIN — ASPIRIN 325 MG ORAL TABLET 325 MG: 325 PILL ORAL at 11:02

## 2022-02-17 RX ADMIN — KETOROLAC TROMETHAMINE 15 MG: 30 INJECTION, SOLUTION INTRAMUSCULAR at 11:02

## 2022-02-17 RX ADMIN — MORPHINE SULFATE 4 MG: 4 INJECTION INTRAVENOUS at 12:02

## 2022-02-17 NOTE — Clinical Note
"Fany Sommermaximiliano Fontenot was seen and treated in our emergency department on 2/17/2022.  She may return to work on 02/21/2022.       If you have any questions or concerns, please don't hesitate to call.      Adela Orellana MD"

## 2022-02-17 NOTE — ED TRIAGE NOTES
Fany Fontenot, a 47 y.o. female presents to the ED via EMS with CC of sharp pain that started in the R arm and moved to her mid chest. Pt states her body locked and she could not move. Pt also states she had SOB, denies N/V/D. Pt does have a family hx of heart disease. States her mom passed from the diagnosis

## 2022-02-17 NOTE — ED PROVIDER NOTES
Encounter Date: 2022       History     Chief Complaint   Patient presents with    Chest Pain     Pt to ED via EMS reporting sudden onset Right side body pain from shoulder through chest to right side/flank. Pt reports pain mostly in chest when she takes a deep breath.      47-year-old female who denies past medical history presents with right-sided chest pain.  Patient states she went to therapy this morning for her right foot, she then went to routine doctor's visit around 9:30 a.m..  After she filled out her paperwork insert up, she had sudden onset of right-sided chest pain and right upper extremity pain.  She states pain is worse with deep breaths and movement.  She denies straining herself for any heavy lifting.  She reports she does feel short of breath.  She is not nauseated, she denies any recent illness such as fever, cough for congestion.  She states she felt well this morning when she woke up and went to her appointments.  Patient's denies use of oral anticoagulants, she had ankle surgery but this was in 2021. She denies smoking history.          Review of patient's allergies indicates:  No Known Allergies  Past Medical History:   Diagnosis Date    Anemia     Bronchitis     Syncope 2019     Past Surgical History:   Procedure Laterality Date     SECTION  2008    DEBRIDEMENT OF CARPAL BONE Right 2015    long finger, s/p traumatic injury    MYOMECTOMY  2007    PERCUTANEOUS PINNING OF FINGER Right 2015    long finger, s/p traumatic injury     Family History   Problem Relation Age of Onset    Stroke Maternal Grandmother     Diabetes Maternal Grandmother     Breast cancer Maternal Grandmother     Diabetes Mother     Hypertension Mother     Diabetes Sister     Colon cancer Neg Hx     Ovarian cancer Neg Hx      Social History     Tobacco Use    Smoking status: Never Smoker    Smokeless tobacco: Never Used   Substance Use Topics    Alcohol use:  Yes     Comment: socially    Drug use: No     Review of Systems   Constitutional: Negative for chills and fever.   HENT: Negative for congestion and sore throat.    Eyes: Negative for visual disturbance.   Respiratory: Positive for shortness of breath. Negative for cough.    Cardiovascular: Positive for chest pain.   Gastrointestinal: Negative for abdominal pain, nausea and vomiting.   Genitourinary: Negative for dysuria and vaginal discharge.   Musculoskeletal: Positive for arthralgias.   Skin: Negative for rash.   Neurological: Negative for headaches.   Psychiatric/Behavioral: Negative for decreased concentration.       Physical Exam     Initial Vitals [02/17/22 1020]   BP Pulse Resp Temp SpO2   (!) 143/79 65 16 98 °F (36.7 °C) 100 %      MAP       --         Physical Exam    Nursing note and vitals reviewed.  Constitutional: She appears well-developed and well-nourished. She is not diaphoretic. No distress.   HENT:   Mouth/Throat: Oropharynx is clear and moist.   Eyes: Conjunctivae are normal.   Neck: Neck supple.   Cardiovascular: Normal rate and regular rhythm.   Pulses:       Radial pulses are 2+ on the right side and 2+ on the left side.   Pulmonary/Chest: No respiratory distress. She has no wheezes. She has no rhonchi. She has no rales. She exhibits no tenderness (There is no reproducible chest tenderness).   SpO2 100% on room air   Abdominal: Abdomen is soft. There is no abdominal tenderness.   Musculoskeletal:         General: No edema.      Cervical back: Neck supple.      Comments: No swelling noted to the right upper extremity.  No lower extremity swelling.  Patient with normal passive range of motion of the right shoulder.  She does have reproducible tenderness with palpation of the right latissimus dorsi muscle.     Neurological: She is alert and oriented to person, place, and time. GCS score is 15. GCS eye subscore is 4. GCS verbal subscore is 5. GCS motor subscore is 6.   5/5  strength  bilaterally, sensation to light touch intact in upper extremities.   Skin: Skin is warm and dry.   No skin rash noted to right flank or right arm   Psychiatric: She has a normal mood and affect.         ED Course   Procedures  Labs Reviewed   CBC W/ AUTO DIFFERENTIAL - Abnormal; Notable for the following components:       Result Value    MCV 81 (*)     MCH 25.3 (*)     MCHC 31.2 (*)     RDW 14.9 (*)     All other components within normal limits   POCT URINE PREGNANCY - Normal   COMPREHENSIVE METABOLIC PANEL   TROPONIN I   TROPONIN I   B-TYPE NATRIURETIC PEPTIDE   D DIMER, QUANTITATIVE   URINALYSIS, REFLEX TO URINE CULTURE    Narrative:     Specimen Source->Urine     EKG Readings: (Independently Interpreted)   Normal sinus rhythm, rate 68 beats per minute, normal NH interval,  milliseconds.  No STEMI.     ECG Results          EKG 12-lead (Final result)  Result time 02/17/22 18:45:30    Final result by Interface, Lab [facility] (02/17/22 18:45:30)                 Narrative:    Test Reason : R07.9,    Vent. Rate : 068 BPM     Atrial Rate : 068 BPM     P-R Int : 184 ms          QRS Dur : 078 ms      QT Int : 428 ms       P-R-T Axes : 066 076 058 degrees     QTc Int : 455 ms    Normal sinus rhythm  Normal ECG  When compared with ECG of 09-DEC-2019 08:50,  No significant change was found  Confirmed by Colt Alfaro MD ([id number]) on 2/17/2022 6:45:22 PM    Referred By: [email or web address]   SELF           Confirmed By:Colt Alfaro MD                            Imaging Results          CTA Chest Non-Coronary (PE Study) (Final result)  Result time 02/17/22 13:48:14    Final result by Sean Goff MD (02/17/22 13:48:14)                 Impression:      1. No pulmonary thromboembolism.  2. No acute pulmonary process.  3. Additional findings above.      Electronically signed by: Sean Goff MD  Date:    02/17/2022  Time:    13:48             Narrative:    EXAMINATION:  CTA CHEST NON CORONARY    CLINICAL HISTORY:  Pulmonary  embolism (PE) suspected, high prob;    TECHNIQUE:  Low dose axial images, sagittal and coronal reformations were obtained from the thoracic inlet to the lung bases following the IV administration of 75 mL of Omnipaque 350.  Contrast timing was optimized to evaluate the pulmonary arteries.  MIP images were performed.    COMPARISON:  CTA chest 03/08/2019    FINDINGS:  The structures at the base of the neck are unremarkable.  No significant mediastinal lymphadenopathy.  The heart is not enlarged.  No pericardial effusion.  The thoracic aorta tapers normally.  The visualized portions of the left kidney, adrenal glands, spleen and pancreas are unremarkable.  There is hypoattenuation of the hepatic parenchyma, likely related to contrast phase however steatosis is not excluded, correlation with LFTs recommended.    The airways are patent.  There is biapical atelectasis or scarring.  No large focal consolidation.  No pneumothorax.  No pleural effusion.    Bolus timing is adequate for evaluation of pulmonary thromboembolism however evaluation is limited secondary to artifact from respiratory motion and habitus.  Allowing for this, no convincing pulmonary arterial filling defect to the level of the segmental arteries bilaterally to suggest pulmonary thromboembolism.    Degenerative changes are noted of the spine.  No significant axillary lymphadenopathy.                                X-Ray Chest AP Portable (Final result)  Result time 02/17/22 11:22:29    Final result by Lonnie Ahn MD (02/17/22 11:22:29)                 Impression:      Cardiomegaly with prominent pulmonary vascularity and interstitial accentuation bilaterally.  Findings could represent mild interstitial edema.    This report was flagged in Epic as abnormal.      Electronically signed by: Lonnie Ahn MD  Date:    02/17/2022  Time:    11:22             Narrative:    EXAMINATION:  XR CHEST AP PORTABLE    CLINICAL HISTORY:  Chest Pain;    TECHNIQUE:  Single  frontal view of the chest was performed.    COMPARISON:  12/09/2019    FINDINGS:  Cardiac silhouette is magnified by portable AP technique.  The heart appears mildly enlarged but unchanged in size and configuration.  Pulmonary vascularity is prominent.  Lungs are satisfactorily expanded.  Mild accentuation of the interstitial pattern bilaterally.  No definite airspace consolidation or pleural effusion.  No pneumothorax.  Skeletal structures appear intact.                                 Medications   aspirin tablet 325 mg (325 mg Oral Given 2/17/22 1101)   ketorolac injection 15 mg (15 mg Intravenous Given 2/17/22 1124)   morphine injection 4 mg (4 mg Intravenous Given 2/17/22 1215)   iohexoL (OMNIPAQUE 350) injection 75 mL (75 mLs Intravenous Given 2/17/22 1316)     Medical Decision Making:   Initial Assessment:   47-year-old female with no past medical history presenting with right-sided chest pain, right arm pain, associated with shortness of breath that started this morning prior to arrival.  On exam, patient is well-appearing no distress.  She does have some reproducible tenderness with the right latissimus dorsi.  She has no upper extremity swelling in the right arm, there is no obvious deformity, she has normal passive range of motion the right shoulder.  Sats are 100% on room air.  I suspect musculoskeletal etiology given reproducible tenderness the right latissimus dorsi, other etiologies considered PE, ACS.  Patient wells score is low, will get D-dimer.  Will get labs including troponin, given symptom onset prior to arrival, will need to trend troponin.  Will treat with aspirin, will give dose of Toradol and reassess.  ED Management:  Patient's reports pain now more in the upper back area, she is tender over the latissimus dorsi muscle of the right side.  Her workup was unremarkable, her troponin is negative, CTA chest negative for PE, UA negative for infection or blood.  I suspect muscle spasm- will treat  with an NSAID and a muscle relaxant, drowsiness precaution reviewed with the patient, I advised to follow-up with her primary care physician, Dr. Starr in 1 week to recheck her symptoms and to return to the ER should she develop any new or worsening symptoms.  Patient states she understands and agrees with the care plan.    Additional MDM:     Well's Criteria Score:  -Clinical symptoms of DVT (leg swelling, pain with palpation) = 0.0  -Other diagnosis less likely than pulmonary embolism =            3.0  -Heart Rate >100 =   0.0  -Immobilization (= or > than 3 days) or surgery in the previous 4 weeks = 0.0  -Previous DVT/PE = 0.0  -Hemoptysis =          0.0  -Malignancy =           0.0  Well's Probability Score =    3      Heart Score:    History:          Slightly suspicious.  ECG:             Normal  Age:               45-65 years  Risk factors: no risk factors known  Troponin:       Less than or equal to normal limit  Final Score: 1                       Clinical Impression:   Final diagnoses:  [R07.9] Chest pain  [M54.9] Upper back pain on right side (Primary)       This dictation has been generated using M-Modal Fluency Direct dictation; some phonetic errors may occur.      ED Disposition Condition    Discharge Stable        ED Prescriptions     Medication Sig Dispense Start Date End Date Auth. Provider    naproxen (NAPROSYN) 500 MG tablet Take 1 tablet (500 mg total) by mouth 2 (two) times daily as needed. 30 tablet 2/17/2022  Adela Orellana MD    cyclobenzaprine (FLEXERIL) 10 MG tablet Take 1 tablet (10 mg total) by mouth 3 (three) times daily as needed for Muscle spasms. 15 tablet 2/17/2022 2/22/2022 Adela Orellana MD        Follow-up Information     Follow up With Specialties Details Why Contact Info    Randa Starr MD Internal Medicine Schedule an appointment as soon as possible for a visit in 1 week  [address] [facility] [id number] [phone]      [facility] [place] - Emergency Dept Emergency  Medicine  As needed, If symptoms worsen [facility] [address]  [facility] [id number] [phone]           Adela Orellana MD  02/20/22 0224

## 2024-08-21 PROBLEM — R07.89 ATYPICAL CHEST PAIN: Status: ACTIVE | Noted: 2024-08-21

## 2024-08-21 PROBLEM — R51.9 NONINTRACTABLE HEADACHE: Status: ACTIVE | Noted: 2024-08-21

## 2024-09-20 ENCOUNTER — OCCUPATIONAL HEALTH (OUTPATIENT)
Dept: URGENT CARE | Facility: CLINIC | Age: 49
End: 2024-09-20

## 2024-09-20 DIAGNOSIS — Z13.9 ENCOUNTER FOR SCREENING: Primary | ICD-10-CM
